# Patient Record
Sex: FEMALE | Race: WHITE | NOT HISPANIC OR LATINO | Employment: UNEMPLOYED | RURAL
[De-identification: names, ages, dates, MRNs, and addresses within clinical notes are randomized per-mention and may not be internally consistent; named-entity substitution may affect disease eponyms.]

---

## 2018-11-15 RX ORDER — NORGESTIMATE AND ETHINYL ESTRADIOL 0.25-0.035
KIT ORAL
Qty: 30 TABLET | Refills: 11 | Status: SHIPPED | OUTPATIENT
Start: 2018-11-15 | End: 2018-11-19 | Stop reason: SDUPTHER

## 2018-11-16 VITALS
HEIGHT: 67 IN | OXYGEN SATURATION: 98 % | TEMPERATURE: 98 F | WEIGHT: 107 LBS | DIASTOLIC BLOOD PRESSURE: 88 MMHG | HEART RATE: 104 BPM | SYSTOLIC BLOOD PRESSURE: 120 MMHG | RESPIRATION RATE: 16 BRPM | BODY MASS INDEX: 16.79 KG/M2

## 2018-11-16 RX ORDER — HYDROXYZINE PAMOATE 25 MG/1
25 CAPSULE ORAL 3 TIMES DAILY PRN
COMMUNITY
End: 2018-11-19 | Stop reason: SDUPTHER

## 2018-11-16 RX ORDER — MONTELUKAST SODIUM 10 MG/1
10 TABLET ORAL NIGHTLY
COMMUNITY
End: 2018-11-19 | Stop reason: SDUPTHER

## 2018-11-16 RX ORDER — ALBUTEROL SULFATE 90 UG/1
2 AEROSOL, METERED RESPIRATORY (INHALATION) EVERY 4 HOURS PRN
COMMUNITY
End: 2019-03-13 | Stop reason: SDUPTHER

## 2018-11-19 ENCOUNTER — OFFICE VISIT (OUTPATIENT)
Dept: FAMILY MEDICINE CLINIC | Facility: CLINIC | Age: 18
End: 2018-11-19

## 2018-11-19 VITALS
HEART RATE: 117 BPM | DIASTOLIC BLOOD PRESSURE: 88 MMHG | OXYGEN SATURATION: 99 % | RESPIRATION RATE: 16 BRPM | WEIGHT: 127 LBS | HEIGHT: 67 IN | SYSTOLIC BLOOD PRESSURE: 129 MMHG | BODY MASS INDEX: 19.93 KG/M2

## 2018-11-19 DIAGNOSIS — L30.9 ECZEMA, UNSPECIFIED TYPE: Primary | ICD-10-CM

## 2018-11-19 DIAGNOSIS — J45.20 MILD INTERMITTENT ASTHMA WITHOUT COMPLICATION: ICD-10-CM

## 2018-11-19 DIAGNOSIS — N92.0 MENORRHAGIA WITH REGULAR CYCLE: ICD-10-CM

## 2018-11-19 PROCEDURE — 99213 OFFICE O/P EST LOW 20 MIN: CPT | Performed by: NURSE PRACTITIONER

## 2018-11-19 RX ORDER — MONTELUKAST SODIUM 10 MG/1
10 TABLET ORAL NIGHTLY
Qty: 30 TABLET | Refills: 5 | Status: SHIPPED | OUTPATIENT
Start: 2018-11-19 | End: 2019-06-10 | Stop reason: SDUPTHER

## 2018-11-19 RX ORDER — HYDROXYZINE PAMOATE 25 MG/1
25 CAPSULE ORAL 3 TIMES DAILY PRN
Qty: 60 CAPSULE | Refills: 5 | Status: SHIPPED | OUTPATIENT
Start: 2018-11-19 | End: 2019-06-10 | Stop reason: SDUPTHER

## 2018-11-19 RX ORDER — NORGESTIMATE AND ETHINYL ESTRADIOL 0.25-0.035
1 KIT ORAL DAILY
Qty: 30 TABLET | Refills: 11 | Status: SHIPPED | OUTPATIENT
Start: 2018-11-19 | End: 2020-01-14 | Stop reason: SINTOL

## 2018-11-19 NOTE — PROGRESS NOTES
Chief Complaint   Patient presents with   • Med Refill     Birthcontrol, hydroxyzine, and singulair.        Subjective   Aydin Thorpe is a 18 y.o.  female who presents today for med refills of her BCP (hormonal regulation), hydroxyzine and Singulair.  She is currently doing well without exacerbation of her eczema.    Aydin Thorpe  has a past medical history of Asthma and Chronic eczematous otitis externa of both ears.    No Known Allergies    Current Outpatient Medications:   •  albuterol (PROVENTIL HFA;VENTOLIN HFA) 108 (90 Base) MCG/ACT inhaler, Inhale 2 puffs Every 4 (Four) Hours As Needed for Wheezing., Disp: , Rfl:   •  ESTARYLLA 0.25-35 MG-MCG per tablet, TAKE ONE TABLET BY MOUTH EVERY DAY, Disp: 30 tablet, Rfl: 11  •  hydrOXYzine (VISTARIL) 25 MG capsule, Take 25 mg by mouth 3 (Three) Times a Day As Needed for Itching., Disp: , Rfl:   •  mometasone (ELOCON) 0.1 % cream, Apply topically 3 times per week to external ears, Disp: 15 g, Rfl: 2  •  montelukast (SINGULAIR) 10 MG tablet, Take 10 mg by mouth Every Night., Disp: , Rfl:   •  PROAIR  (90 BASE) MCG/ACT inhaler, , Disp: , Rfl:   Past Medical History:   Diagnosis Date   • Asthma    • Chronic eczematous otitis externa of both ears      History reviewed. No pertinent surgical history.  Social History     Socioeconomic History   • Marital status: Single     Spouse name: Not on file   • Number of children: Not on file   • Years of education: Not on file   • Highest education level: Not on file   Tobacco Use   • Smoking status: Never Smoker   • Smokeless tobacco: Never Used   Substance and Sexual Activity   • Alcohol use: No   • Drug use: No   • Sexual activity: Defer     History reviewed. No pertinent family history.    Family history, surgical history, past medical history, Allergies and med's reviewed with patient today and updated in [facility] EMR.     ROS:  Review of Systems   Constitutional: Negative.    HENT: Negative.    Eyes: Negative.   "  Respiratory: Negative.    Cardiovascular: Negative.    Gastrointestinal: Negative.    Endocrine: Negative.    Genitourinary: Negative.    Musculoskeletal: Negative.    Skin: Negative.    Allergic/Immunologic: Negative.    Neurological: Negative.    Hematological: Negative.    Psychiatric/Behavioral: Negative.        OBJECTIVE:  Vitals:    11/19/18 1408   BP: 129/88   Pulse: 117   Resp: 16   SpO2: 99%   Weight: 57.6 kg (127 lb)   Height: 170.2 cm (67\")     Physical Exam   Constitutional: She is oriented to person, place, and time. She appears well-developed and well-nourished.   HENT:   Head: Normocephalic and atraumatic.   Eyes: Conjunctivae and EOM are normal. Pupils are equal, round, and reactive to light.   Neck: Normal range of motion. Neck supple.   Cardiovascular: Normal rate, regular rhythm and normal heart sounds.   Pulmonary/Chest: Effort normal and breath sounds normal.   Abdominal: Soft.   Neurological: She is alert and oriented to person, place, and time.   Skin: Skin is warm and dry.   Psychiatric: She has a normal mood and affect. Her behavior is normal. Judgment and thought content normal.   Nursing note and vitals reviewed.      ASSESSMENT/ PLAN:    Aydin was seen today for med refill.    Diagnoses and all orders for this visit:    Eczema, unspecified type  -     hydrOXYzine (VISTARIL) 25 MG capsule; Take 1 capsule by mouth 3 (Three) Times a Day As Needed for Itching.    Mild intermittent asthma without complication  -     montelukast (SINGULAIR) 10 MG tablet; Take 1 tablet by mouth Every Night.    Menorrhagia with regular cycle  -     norgestimate-ethinyl estradiol (ESTARYLLA) 0.25-35 MG-MCG per tablet; Take 1 tablet by mouth Daily.        Orders Placed Today:     New Medications Ordered This Visit   Medications   • norgestimate-ethinyl estradiol (ESTARYLLA) 0.25-35 MG-MCG per tablet     Sig: Take 1 tablet by mouth Daily.     Dispense:  30 tablet     Refill:  11   • hydrOXYzine (VISTARIL) 25 MG " capsule     Sig: Take 1 capsule by mouth 3 (Three) Times a Day As Needed for Itching.     Dispense:  60 capsule     Refill:  5   • montelukast (SINGULAIR) 10 MG tablet     Sig: Take 1 tablet by mouth Every Night.     Dispense:  30 tablet     Refill:  5        Management Plan:     An After Visit Summary was printed and given to the patient at discharge.    Follow-up: Return in about 1 year (around 11/19/2019) for Next scheduled follow up.    Yessi Malone, STEPHANIE 11/19/2018 2:20 PM  This note was electronically signed.

## 2018-12-04 ENCOUNTER — TELEPHONE (OUTPATIENT)
Dept: FAMILY MEDICINE CLINIC | Facility: CLINIC | Age: 18
End: 2018-12-04

## 2018-12-04 RX ORDER — CEPHALEXIN 500 MG/1
500 CAPSULE ORAL 3 TIMES DAILY
Qty: 21 CAPSULE | Refills: 0 | Status: SHIPPED | OUTPATIENT
Start: 2018-12-04 | End: 2018-12-11

## 2018-12-04 NOTE — TELEPHONE ENCOUNTER
PT'S MOTHER WOULD LIKE ANTIBIOTICS CALLED IN FOR HER RIGHT EAR. THE EAR HAS AN ODOR AND GREEN/YELLOW DRAINAGE COMING OUT. PLEASE SEND TO [facility] [place].

## 2019-01-22 ENCOUNTER — OFFICE VISIT (OUTPATIENT)
Dept: FAMILY MEDICINE CLINIC | Facility: CLINIC | Age: 19
End: 2019-01-22

## 2019-01-22 VITALS
SYSTOLIC BLOOD PRESSURE: 136 MMHG | HEART RATE: 120 BPM | OXYGEN SATURATION: 97 % | RESPIRATION RATE: 18 BRPM | BODY MASS INDEX: 21.6 KG/M2 | HEIGHT: 67 IN | DIASTOLIC BLOOD PRESSURE: 96 MMHG | WEIGHT: 137.6 LBS

## 2019-01-22 DIAGNOSIS — H60.313 ACUTE DIFFUSE OTITIS EXTERNA OF BOTH EARS: ICD-10-CM

## 2019-01-22 DIAGNOSIS — H60.543 ECZEMA OF BOTH EXTERNAL EARS: Primary | ICD-10-CM

## 2019-01-22 DIAGNOSIS — R63.5 WEIGHT GAIN: Primary | ICD-10-CM

## 2019-01-22 DIAGNOSIS — R63.5 WEIGHT GAIN: ICD-10-CM

## 2019-01-22 DIAGNOSIS — R68.89 CUSHINGOID FACIES: ICD-10-CM

## 2019-01-22 DIAGNOSIS — R53.83 FATIGUE, UNSPECIFIED TYPE: ICD-10-CM

## 2019-01-22 PROCEDURE — 99213 OFFICE O/P EST LOW 20 MIN: CPT | Performed by: NURSE PRACTITIONER

## 2019-01-22 RX ORDER — BETAMETHASONE DIPROPIONATE 0.5 MG/G
CREAM TOPICAL 2 TIMES DAILY
Qty: 15 G | Refills: 2 | Status: SHIPPED | OUTPATIENT
Start: 2019-01-22 | End: 2021-08-25

## 2019-01-22 NOTE — PROGRESS NOTES
Chief Complaint   Patient presents with   • Rash        Subjective   Aydin Thorpe is a 18 y.o.  female who presents today for flare up of eczema.    HPI:  She states she is having the worst flare up than she has had in quite some time.  She does not note a particular trigger.  She states that her ears are painful and running.  She also has an increase in rash of the antecubital spaces and under the breasts.    Aydin Thorpe  has a past medical history of Asthma and Chronic eczematous otitis externa of both ears.    No Known Allergies    Current Outpatient Medications:   •  albuterol (PROVENTIL HFA;VENTOLIN HFA) 108 (90 Base) MCG/ACT inhaler, Inhale 2 puffs Every 4 (Four) Hours As Needed for Wheezing., Disp: , Rfl:   •  hydrOXYzine (VISTARIL) 25 MG capsule, Take 1 capsule by mouth 3 (Three) Times a Day As Needed for Itching., Disp: 60 capsule, Rfl: 5  •  montelukast (SINGULAIR) 10 MG tablet, Take 1 tablet by mouth Every Night., Disp: 30 tablet, Rfl: 5  •  norgestimate-ethinyl estradiol (ESTARYLLA) 0.25-35 MG-MCG per tablet, Take 1 tablet by mouth Daily., Disp: 30 tablet, Rfl: 11  Past Medical History:   Diagnosis Date   • Asthma    • Chronic eczematous otitis externa of both ears      History reviewed. No pertinent surgical history.  Social History     Socioeconomic History   • Marital status: Single     Spouse name: Not on file   • Number of children: Not on file   • Years of education: Not on file   • Highest education level: Not on file   Tobacco Use   • Smoking status: Never Smoker   • Smokeless tobacco: Never Used   Substance and Sexual Activity   • Alcohol use: No   • Drug use: No   • Sexual activity: Defer     Family History   Problem Relation Age of Onset   • Osteoarthritis Mother    • Asthma Mother    • Rheum arthritis Father    • Asthma Father        Family history, surgical history, past medical history, Allergies and med's reviewed with patient today and updated in [employer] EMR.     ROS:  Review  "of Systems   Constitutional: Negative.    HENT: Negative.    Eyes: Negative.    Respiratory: Negative.    Cardiovascular: Negative.    Gastrointestinal: Negative.    Endocrine: Negative.    Genitourinary: Negative.    Musculoskeletal: Negative.    Skin: Positive for rash.        eczema   Allergic/Immunologic: Negative.    Neurological: Negative.    Hematological: Negative.    Psychiatric/Behavioral: Negative.        OBJECTIVE:  Vitals:    01/22/19 1457   BP: 136/96   BP Location: Left arm   Patient Position: Sitting   Cuff Size: Adult   Pulse: 120   Resp: 18   SpO2: 97%   Weight: 62.4 kg (137 lb 9.6 oz)   Height: 170.2 cm (67\")     Physical Exam   Constitutional: She is oriented to person, place, and time. She appears well-developed and well-nourished.   HENT:   Head: Normocephalic and atraumatic.   Nose: Nose normal.   Mouth/Throat: Oropharynx is clear and moist.   Auditory canals with erythema, edema and mild drainage. There is scaling of the skin of the external ear.   Eyes: Conjunctivae and EOM are normal. Pupils are equal, round, and reactive to light.   Neck: Normal range of motion. Neck supple.   Cardiovascular: Normal rate, regular rhythm and normal heart sounds.   Pulmonary/Chest: Effort normal and breath sounds normal.   Abdominal: Soft. Bowel sounds are normal.   Musculoskeletal: Normal range of motion.   Neurological: She is alert and oriented to person, place, and time.   Skin: Skin is warm and dry. Capillary refill takes less than 2 seconds. Rash noted.   Bilateral ears, antecubital spaces and underneath bilateral breasts consistent with previous outbreaks of eczema.   Psychiatric: She has a normal mood and affect. Her behavior is normal. Judgment and thought content normal.   Nursing note and vitals reviewed.      ASSESSMENT/ PLAN:    Aydin was seen today for rash.    Diagnoses and all orders for this visit:    Eczema of both external ears  -     betamethasone dipropionate (DIPROLENE) 0.05 % cream; " Apply  topically to the appropriate area as directed 2 (Two) Times a Day.    Weight gain    Acute diffuse otitis externa of both ears  -     neomycin-polymyxin-hydrocortisone (CORTISPORIN) 3.5-48531-2 otic solution; Administer 3 drops into both ears 4 (Four) Times a Day for 5 days.        Orders Placed Today:     New Medications Ordered This Visit   Medications   • neomycin-polymyxin-hydrocortisone (CORTISPORIN) 3.5-03616-6 otic solution     Sig: Administer 3 drops into both ears 4 (Four) Times a Day for 5 days.     Dispense:  10 mL     Refill:  0   • betamethasone dipropionate (DIPROLENE) 0.05 % cream     Sig: Apply  topically to the appropriate area as directed 2 (Two) Times a Day.     Dispense:  15 g     Refill:  2        Management Plan:     An After Visit Summary was printed and given to the patient at discharge.    Follow-up: Return in about 3 months (around 4/22/2019) for Next scheduled follow up.    Yessi Malone, STEPHANIE 1/22/2019 3:33 PM  This note was electronically signed.

## 2019-01-23 ENCOUNTER — CLINICAL SUPPORT (OUTPATIENT)
Dept: FAMILY MEDICINE CLINIC | Facility: CLINIC | Age: 19
End: 2019-01-23

## 2019-01-23 ENCOUNTER — RESULTS ENCOUNTER (OUTPATIENT)
Dept: FAMILY MEDICINE CLINIC | Facility: CLINIC | Age: 19
End: 2019-01-23

## 2019-01-23 DIAGNOSIS — R53.83 FATIGUE, UNSPECIFIED TYPE: ICD-10-CM

## 2019-01-23 DIAGNOSIS — R63.5 WEIGHT GAIN: ICD-10-CM

## 2019-01-23 DIAGNOSIS — R68.89 CUSHINGOID FACIES: ICD-10-CM

## 2019-01-25 ENCOUNTER — TELEPHONE (OUTPATIENT)
Dept: FAMILY MEDICINE CLINIC | Facility: CLINIC | Age: 19
End: 2019-01-25

## 2019-01-28 NOTE — TELEPHONE ENCOUNTER
Aydin needs an appointment to discuss lab work.  Nothing serious but issues that we need to discuss.

## 2019-01-31 ENCOUNTER — OFFICE VISIT (OUTPATIENT)
Dept: FAMILY MEDICINE CLINIC | Facility: CLINIC | Age: 19
End: 2019-01-31

## 2019-01-31 VITALS
BODY MASS INDEX: 21.97 KG/M2 | OXYGEN SATURATION: 96 % | HEART RATE: 145 BPM | TEMPERATURE: 98.3 F | DIASTOLIC BLOOD PRESSURE: 89 MMHG | SYSTOLIC BLOOD PRESSURE: 133 MMHG | HEIGHT: 67 IN | WEIGHT: 140 LBS | RESPIRATION RATE: 19 BRPM

## 2019-01-31 DIAGNOSIS — E78.5 HYPERLIPIDEMIA, UNSPECIFIED HYPERLIPIDEMIA TYPE: ICD-10-CM

## 2019-01-31 DIAGNOSIS — E03.9 HYPOTHYROIDISM, UNSPECIFIED TYPE: Primary | ICD-10-CM

## 2019-01-31 DIAGNOSIS — R63.5 WEIGHT GAIN: ICD-10-CM

## 2019-01-31 DIAGNOSIS — I10 ESSENTIAL HYPERTENSION: ICD-10-CM

## 2019-01-31 DIAGNOSIS — L30.9 ECZEMA, UNSPECIFIED TYPE: ICD-10-CM

## 2019-01-31 PROCEDURE — 96372 THER/PROPH/DIAG INJ SC/IM: CPT | Performed by: NURSE PRACTITIONER

## 2019-01-31 PROCEDURE — 99213 OFFICE O/P EST LOW 20 MIN: CPT | Performed by: NURSE PRACTITIONER

## 2019-01-31 RX ORDER — LEVOTHYROXINE SODIUM 0.07 MG/1
75 TABLET ORAL DAILY
Qty: 30 TABLET | Refills: 2 | Status: SHIPPED | OUTPATIENT
Start: 2019-01-31 | End: 2019-04-29 | Stop reason: SDUPTHER

## 2019-01-31 RX ORDER — METHYLPREDNISOLONE ACETATE 40 MG/ML
40 INJECTION, SUSPENSION INTRA-ARTICULAR; INTRALESIONAL; INTRAMUSCULAR; SOFT TISSUE ONCE
Status: COMPLETED | OUTPATIENT
Start: 2019-01-31 | End: 2019-01-31

## 2019-01-31 RX ADMIN — METHYLPREDNISOLONE ACETATE 40 MG: 40 INJECTION, SUSPENSION INTRA-ARTICULAR; INTRALESIONAL; INTRAMUSCULAR; SOFT TISSUE at 15:08

## 2019-01-31 NOTE — PATIENT INSTRUCTIONS
"Fat and Cholesterol Restricted Diet  Getting too much fat and cholesterol in your diet may cause health problems. Following this diet helps keep your fat and cholesterol at normal levels. This can keep you from getting sick.  What types of fat should I choose?  · Choose monosaturated and polyunsaturated fats. These are found in foods such as olive oil, canola oil, flaxseeds, walnuts, almonds, and seeds.  · Eat more omega-3 fats. Good choices include salmon, mackerel, sardines, tuna, flaxseed oil, and ground flaxseeds.  · Limit saturated fats. These are in animal products such as meats, butter, and cream. They can also be in plant products such as palm oil, palm kernel oil, and coconut oil.  · Avoid foods with partially hydrogenated oils in them. These contain trans fats. Examples of foods that have trans fats are stick margarine, some tub margarines, cookies, crackers, and other baked goods.  What general guidelines do I need to follow?  · Check food labels. Look for the words \"trans fat\" and \"saturated fat.\"  · When preparing a meal:  ? Fill half of your plate with vegetables and green salads.  ? Fill one fourth of your plate with whole grains. Look for the word \"whole\" as the first word in the ingredient list.  ? Fill one fourth of your plate with lean protein foods.  · Eat more foods that have fiber, like apples, carrots, beans, peas, and barley.  · Eat more home-cooked foods. Eat less at restaurants and buffets.  · Limit or avoid alcohol.  · Limit foods high in starch and sugar.  · Limit fried foods.  · Cook foods without frying them. Baking, boiling, grilling, and broiling are all great options.  · Lose weight if you are overweight. Losing even a small amount of weight can help your overall health. It can also help prevent diseases such as diabetes and heart disease.  What foods can I eat?  Grains  Whole grains, such as whole wheat or whole grain breads, crackers, cereals, and pasta. Unsweetened oatmeal, " bulgur, barley, quinoa, or brown rice. Corn or whole wheat flour tortillas.  Vegetables  Fresh or frozen vegetables (raw, steamed, roasted, or grilled). Green salads.  Fruits  All fresh, canned (in natural juice), or frozen fruits.  Meat and Other Protein Products  Ground beef (85% or leaner), grass-fed beef, or beef trimmed of fat. Skinless chicken or turkey. Ground chicken or turkey. Pork trimmed of fat. All fish and seafood. Eggs. Dried beans, peas, or lentils. Unsalted nuts or seeds. Unsalted canned or dry beans.  Dairy  Low-fat dairy products, such as skim or 1% milk, 2% or reduced-fat cheeses, low-fat ricotta or cottage cheese, or plain low-fat yogurt.  Fats and Oils  Tub margarines without trans fats. Light or reduced-fat mayonnaise and salad dressings. Avocado. Olive, canola, sesame, or safflower oils. Natural peanut or almond butter (choose ones without added sugar and oil).  The items listed above may not be a complete list of recommended foods or beverages. Contact your dietitian for more options.  What foods are not recommended?  Grains  White bread. White pasta. White rice. Cornbread. Bagels, pastries, and croissants. Crackers that contain trans fat.  Vegetables  White potatoes. Corn. Creamed or fried vegetables. Vegetables in a cheese sauce.  Fruits  Dried fruits. Canned fruit in light or heavy syrup. Fruit juice.  Meat and Other Protein Products  Fatty cuts of meat. Ribs, chicken wings, van, sausage, bologna, salami, chitterlings, fatback, hot dogs, bratwurst, and packaged luncheon meats. Liver and organ meats.  Dairy  Whole or 2% milk, cream, half-and-half, and cream cheese. Whole milk cheeses. Whole-fat or sweetened yogurt. Full-fat cheeses. Nondairy creamers and whipped toppings. Processed cheese, cheese spreads, or cheese curds.  Sweets and Desserts  Corn syrup, sugars, honey, and molasses. Candy. Jam and jelly. Syrup. Sweetened cereals. Cookies, pies, cakes, donuts, muffins, and ice  cream.  Fats and Oils  Butter, stick margarine, lard, shortening, ghee, or van fat. Coconut, palm kernel, or palm oils.  Beverages  Alcohol. Sweetened drinks (such as sodas, lemonade, and fruit drinks or punches).  The items listed above may not be a complete list of foods and beverages to avoid. Contact your dietitian for more information.  This information is not intended to replace advice given to you by your health care provider. Make sure you discuss any questions you have with your health care provider.  Document Released: 06/18/2013 Document Revised: 08/24/2017 Document Reviewed: 03/19/2015  [employer] Interactive Patient Education © 2018 [employer]

## 2019-01-31 NOTE — PROGRESS NOTES
Chief Complaint   Patient presents with   • Results        Subjective   Aydin Thorpe is a 18 y.o.  female who presents today for lab results.    HPI:  Aydin presents today to discuss her lab results.  She continues to be concerned about her weight gain; otherwise, there are no changes.    Aydin Thorpe  has a past medical history of Asthma and Chronic eczematous otitis externa of both ears.    No Known Allergies    Current Outpatient Medications:   •  albuterol (PROVENTIL HFA;VENTOLIN HFA) 108 (90 Base) MCG/ACT inhaler, Inhale 2 puffs Every 4 (Four) Hours As Needed for Wheezing., Disp: , Rfl:   •  betamethasone dipropionate (DIPROLENE) 0.05 % cream, Apply  topically to the appropriate area as directed 2 (Two) Times a Day., Disp: 15 g, Rfl: 2  •  dexamethasone (DECADRON) 0.75 MG tablet, TAKE AS DIRECTED BY MOUTH, Disp: 102 tablet, Rfl: 5  •  hydrOXYzine (VISTARIL) 25 MG capsule, Take 1 capsule by mouth 3 (Three) Times a Day As Needed for Itching., Disp: 60 capsule, Rfl: 5  •  montelukast (SINGULAIR) 10 MG tablet, Take 1 tablet by mouth Every Night., Disp: 30 tablet, Rfl: 5  •  norgestimate-ethinyl estradiol (ESTARYLLA) 0.25-35 MG-MCG per tablet, Take 1 tablet by mouth Daily., Disp: 30 tablet, Rfl: 11  Past Medical History:   Diagnosis Date   • Asthma    • Chronic eczematous otitis externa of both ears      History reviewed. No pertinent surgical history.  Social History     Socioeconomic History   • Marital status: Single     Spouse name: Not on file   • Number of children: Not on file   • Years of education: Not on file   • Highest education level: Not on file   Tobacco Use   • Smoking status: Never Smoker   • Smokeless tobacco: Never Used   Substance and Sexual Activity   • Alcohol use: No   • Drug use: No   • Sexual activity: Defer     Family History   Problem Relation Age of Onset   • Osteoarthritis Mother    • Asthma Mother    • Rheum arthritis Father    • Asthma Father    • No Known Problems  "Brother    • Heart disease Maternal Grandmother    • No Known Problems Maternal Grandfather    • No Known Problems Paternal Grandmother    • No Known Problems Paternal Grandfather    • No Known Problems Brother        Family history, surgical history, past medical history, Allergies and med's reviewed with patient today and updated in [facility] EMR.     ROS:  Review of Systems   Constitutional: Negative.    HENT: Negative.    Eyes: Negative.    Respiratory: Negative.    Cardiovascular: Negative.    Gastrointestinal: Negative.    Endocrine: Negative.    Genitourinary: Negative.    Musculoskeletal: Negative.    Skin: Positive for rash.        Eczema breakout   Allergic/Immunologic: Negative.    Neurological: Negative.    Hematological: Negative.    Psychiatric/Behavioral: Negative.        OBJECTIVE:  Vitals:    01/31/19 1425   BP: 133/89   BP Location: Left arm   Patient Position: Sitting   Cuff Size: Adult   Pulse: (!) 145   Resp: 19   Temp: 98.3 °F (36.8 °C)   TempSrc: Temporal   SpO2: 96%   Weight: 63.5 kg (140 lb)   Height: 170.2 cm (67\")     Physical Exam   Constitutional: She is oriented to person, place, and time. She appears well-developed and well-nourished.   HENT:   Head: Normocephalic and atraumatic.   Eyes: Conjunctivae and EOM are normal. Pupils are equal, round, and reactive to light.   Neck: Normal range of motion. Neck supple.   Cardiovascular: Normal rate, regular rhythm and normal heart sounds.   Pulmonary/Chest: Effort normal and breath sounds normal.   Abdominal: Soft.   Musculoskeletal: Normal range of motion.   Neurological: She is alert and oriented to person, place, and time.   Skin: Skin is warm. Rash noted.   Present on face, antecubital space and abdomen consistent with her eczema.   Psychiatric: She has a normal mood and affect. Her behavior is normal. Judgment and thought content normal.   Nursing note and vitals reviewed.      ASSESSMENT/ PLAN:    Aydin was seen today for " results.    Diagnoses and all orders for this visit:    Hypothyroidism, unspecified type  -     levothyroxine (SYNTHROID) 75 MCG tablet; Take 1 tablet by mouth Daily.    Hyperlipidemia, unspecified hyperlipidemia type    Essential hypertension    Eczema, unspecified type  -     methylPREDNISolone acetate (DEPO-medrol) injection 40 mg; Inject 1 mL into the appropriate muscle as directed by prescriber 1 (One) Time.    Weight gain        Orders Placed Today:     New Medications Ordered This Visit   Medications   • levothyroxine (SYNTHROID) 75 MCG tablet     Sig: Take 1 tablet by mouth Daily.     Dispense:  30 tablet     Refill:  2        Management Plan:     An After Visit Summary was printed and given to the patient at discharge.    Follow-up: Return in about 2 weeks (around 2/14/2019) for b/p check.    Yessi Malone, STEPHANIE 1/31/2019 2:52 PM  This note was electronically signed.

## 2019-02-07 ENCOUNTER — OFFICE VISIT (OUTPATIENT)
Dept: FAMILY MEDICINE CLINIC | Facility: CLINIC | Age: 19
End: 2019-02-07

## 2019-02-07 VITALS
HEIGHT: 67 IN | BODY MASS INDEX: 21.56 KG/M2 | OXYGEN SATURATION: 97 % | HEART RATE: 123 BPM | WEIGHT: 137.4 LBS | SYSTOLIC BLOOD PRESSURE: 120 MMHG | DIASTOLIC BLOOD PRESSURE: 86 MMHG | TEMPERATURE: 98 F

## 2019-02-07 DIAGNOSIS — H60.313 ACUTE DIFFUSE OTITIS EXTERNA OF BOTH EARS: Primary | ICD-10-CM

## 2019-02-07 DIAGNOSIS — L30.9 ECZEMA, UNSPECIFIED TYPE: ICD-10-CM

## 2019-02-07 DIAGNOSIS — H66.90 ACUTE OTITIS MEDIA, UNSPECIFIED OTITIS MEDIA TYPE: ICD-10-CM

## 2019-02-07 DIAGNOSIS — B37.2 CUTANEOUS CANDIDIASIS: ICD-10-CM

## 2019-02-07 PROCEDURE — 99213 OFFICE O/P EST LOW 20 MIN: CPT | Performed by: NURSE PRACTITIONER

## 2019-02-07 RX ORDER — AMOXICILLIN 500 MG/1
500 CAPSULE ORAL 3 TIMES DAILY
Qty: 30 CAPSULE | Refills: 0 | Status: SHIPPED | OUTPATIENT
Start: 2019-02-07 | End: 2019-02-17

## 2019-02-07 RX ORDER — NEOMYCIN SULFATE, POLYMYXIN B SULFATE, HYDROCORTISONE 3.5; 10000; 1 MG/ML; [USP'U]/ML; MG/ML
4 SOLUTION/ DROPS AURICULAR (OTIC) 3 TIMES DAILY
Qty: 3 ML | Refills: 0 | Status: SHIPPED | OUTPATIENT
Start: 2019-02-07 | End: 2019-02-12

## 2019-02-07 RX ORDER — PREDNISONE 10 MG/1
TABLET ORAL
Qty: 18 TABLET | Refills: 0 | Status: SHIPPED | OUTPATIENT
Start: 2019-02-07 | End: 2019-02-21 | Stop reason: SDUPTHER

## 2019-02-07 RX ORDER — FLUCONAZOLE 150 MG/1
TABLET ORAL
Qty: 2 TABLET | Refills: 0 | Status: SHIPPED | OUTPATIENT
Start: 2019-02-07 | End: 2019-05-09

## 2019-02-07 NOTE — PROGRESS NOTES
Chief Complaint   Patient presents with   • Ear Drainage     bilateral , R>L   • Eczema     flare up        Subjective   Aydin Thorpe is a 18 y.o.  female who presents today for bilateral ear drainage.    HPI:  Patient is well known to practice with severe eczema.  She presents today with increase in foul smelling drainage from both ears, R>L.  She also complains of increased outbreak of skin lesions on upper extremities and abdomen.    Aydin Thorpe  has a past medical history of Asthma and Chronic eczematous otitis externa of both ears.    No Known Allergies    Current Outpatient Medications:   •  albuterol (PROVENTIL HFA;VENTOLIN HFA) 108 (90 Base) MCG/ACT inhaler, Inhale 2 puffs Every 4 (Four) Hours As Needed for Wheezing., Disp: , Rfl:   •  betamethasone dipropionate (DIPROLENE) 0.05 % cream, Apply  topically to the appropriate area as directed 2 (Two) Times a Day., Disp: 15 g, Rfl: 2  •  hydrOXYzine (VISTARIL) 25 MG capsule, Take 1 capsule by mouth 3 (Three) Times a Day As Needed for Itching., Disp: 60 capsule, Rfl: 5  •  levothyroxine (SYNTHROID) 75 MCG tablet, Take 1 tablet by mouth Daily., Disp: 30 tablet, Rfl: 2  •  montelukast (SINGULAIR) 10 MG tablet, Take 1 tablet by mouth Every Night., Disp: 30 tablet, Rfl: 5  •  norgestimate-ethinyl estradiol (ESTARYLLA) 0.25-35 MG-MCG per tablet, Take 1 tablet by mouth Daily., Disp: 30 tablet, Rfl: 11  Past Medical History:   Diagnosis Date   • Asthma    • Chronic eczematous otitis externa of both ears      History reviewed. No pertinent surgical history.  Social History     Socioeconomic History   • Marital status: Single     Spouse name: Not on file   • Number of children: Not on file   • Years of education: Not on file   • Highest education level: Not on file   Tobacco Use   • Smoking status: Never Smoker   • Smokeless tobacco: Never Used   Substance and Sexual Activity   • Alcohol use: No   • Drug use: No   • Sexual activity: Defer     Family  "History   Problem Relation Age of Onset   • Osteoarthritis Mother    • Asthma Mother    • Rheum arthritis Father    • Asthma Father    • No Known Problems Brother    • Heart disease Maternal Grandmother    • No Known Problems Maternal Grandfather    • No Known Problems Paternal Grandmother    • No Known Problems Paternal Grandfather    • No Known Problems Brother        Family history, surgical history, past medical history, Allergies and med's reviewed with patient today and updated in [facility] EMR.     ROS:  Review of Systems   Constitutional: Positive for fatigue.   HENT: Positive for ear discharge and ear pain.    Eyes: Negative.    Respiratory: Negative.    Cardiovascular: Negative.    Gastrointestinal: Negative.    Endocrine: Negative.    Genitourinary: Negative.    Musculoskeletal: Negative.    Skin: Positive for rash.   Allergic/Immunologic: Negative.    Neurological: Negative.    Hematological: Negative.    Psychiatric/Behavioral: Negative.        OBJECTIVE:  Vitals:    02/07/19 0838   BP: 120/86   BP Location: Left arm   Patient Position: Sitting   Cuff Size: Adult   Pulse: (!) 123   Temp: 98 °F (36.7 °C)   TempSrc: Tympanic   SpO2: 97%   Weight: 62.3 kg (137 lb 6.4 oz)   Height: 170.2 cm (67\")     Physical Exam   Constitutional: She is oriented to person, place, and time. She appears well-developed and well-nourished.   HENT:   Head: Normocephalic and atraumatic.   Nose: Nose normal.   Mouth/Throat: Oropharynx is clear and moist.   Bilateral auditory canals are erythematous and edematous with white drainage present bilaterally.  The right TM is very erythematous with suppurative effusion.  Nasopharynx and oropharynx are clear.   Eyes: Conjunctivae and EOM are normal. Pupils are equal, round, and reactive to light.   Neck: Normal range of motion. Neck supple.   Cardiovascular: Regular rhythm and normal heart sounds.   tachycardia with auscultated rate of 102.   Pulmonary/Chest: Effort normal and breath sounds " normal.   Abdominal: Soft. Bowel sounds are normal.   Musculoskeletal: Normal range of motion.   Neurological: She is alert and oriented to person, place, and time.   Skin: Skin is warm and dry. Rash noted.   Significant dry patches of eczema on bilateral antecubital spaces and wrists with some weeping. Weeping of the external ears.  Dry scaly patches present on the abdomen.   Psychiatric: She has a normal mood and affect. Her behavior is normal. Judgment and thought content normal.   Nursing note and vitals reviewed.      ASSESSMENT/ PLAN:    Aydin was seen today for ear drainage and eczema.    Diagnoses and all orders for this visit:    Acute diffuse otitis externa of both ears  -     neomycin-polymyxin-hydrocortisone (CORTISPORIN) 1 % solution otic solution; Administer 4 drops into both ears 3 (Three) Times a Day for 5 days.  -     amoxicillin (AMOXIL) 500 MG capsule; Take 1 capsule by mouth 3 (Three) Times a Day for 10 days.    Acute otitis media, unspecified otitis media type  -     amoxicillin (AMOXIL) 500 MG capsule; Take 1 capsule by mouth 3 (Three) Times a Day for 10 days.    Eczema, unspecified type  -     predniSONE (DELTASONE) 10 MG tablet; Tid x 3 days, then bid x 3 days, then q day x 3 days    Cutaneous candidiasis  -     fluconazole (DIFLUCAN) 150 MG tablet; Take one today and one at completion of antibiotics        Orders Placed Today:     New Medications Ordered This Visit   Medications   • neomycin-polymyxin-hydrocortisone (CORTISPORIN) 1 % solution otic solution     Sig: Administer 4 drops into both ears 3 (Three) Times a Day for 5 days.     Dispense:  3 mL     Refill:  0   • amoxicillin (AMOXIL) 500 MG capsule     Sig: Take 1 capsule by mouth 3 (Three) Times a Day for 10 days.     Dispense:  30 capsule     Refill:  0   • fluconazole (DIFLUCAN) 150 MG tablet     Sig: Take one today and one at completion of antibiotics     Dispense:  2 tablet     Refill:  0   • predniSONE (DELTASONE) 10 MG tablet      Sig: Tid x 3 days, then bid x 3 days, then q day x 3 days     Dispense:  18 tablet     Refill:  0        Management Plan:     An After Visit Summary was printed and given to the patient at discharge.    Follow-up: Return in about 3 months (around 5/7/2019) for Next scheduled follow up with labs prior.    Yessi Malone, APRN 2/7/2019 8:53 AM  This note was electronically signed.

## 2019-02-21 ENCOUNTER — TELEPHONE (OUTPATIENT)
Dept: FAMILY MEDICINE CLINIC | Facility: CLINIC | Age: 19
End: 2019-02-21

## 2019-02-21 DIAGNOSIS — L30.9 ECZEMA, UNSPECIFIED TYPE: ICD-10-CM

## 2019-02-21 RX ORDER — PREDNISONE 10 MG/1
TABLET ORAL
Qty: 18 TABLET | Refills: 0 | Status: SHIPPED | OUTPATIENT
Start: 2019-02-21 | End: 2019-05-09

## 2019-03-14 RX ORDER — ALBUTEROL SULFATE 90 UG/1
AEROSOL, METERED RESPIRATORY (INHALATION)
Qty: 18 G | Refills: 5 | Status: SHIPPED | OUTPATIENT
Start: 2019-03-14 | End: 2019-12-19

## 2019-04-29 ENCOUNTER — TELEPHONE (OUTPATIENT)
Dept: FAMILY MEDICINE CLINIC | Facility: CLINIC | Age: 19
End: 2019-04-29

## 2019-04-29 DIAGNOSIS — E03.9 HYPOTHYROIDISM, UNSPECIFIED TYPE: ICD-10-CM

## 2019-04-29 RX ORDER — LEVOTHYROXINE SODIUM 0.07 MG/1
75 TABLET ORAL DAILY
Qty: 30 TABLET | Refills: 0 | Status: SHIPPED | OUTPATIENT
Start: 2019-04-29 | End: 2019-05-02

## 2019-04-29 NOTE — TELEPHONE ENCOUNTER
I am going to give patient a 30 day refill.  However I have no chart here for her.  Patient labs are up to date but it's looking like she needs a CPE.  Can you call and schedule please.   If she has had one then I need to know so I can update her health maint.

## 2019-04-29 NOTE — TELEPHONE ENCOUNTER
Pt mother called requesting refill of Thyroid medication for Pt. She would like this sent to [facility].

## 2019-05-01 ENCOUNTER — CLINICAL SUPPORT (OUTPATIENT)
Dept: FAMILY MEDICINE CLINIC | Facility: CLINIC | Age: 19
End: 2019-05-01

## 2019-05-01 DIAGNOSIS — E03.9 HYPOTHYROIDISM, UNSPECIFIED TYPE: ICD-10-CM

## 2019-05-01 DIAGNOSIS — I10 ESSENTIAL HYPERTENSION: ICD-10-CM

## 2019-05-01 DIAGNOSIS — Z00.00 ANNUAL PHYSICAL EXAM: Primary | ICD-10-CM

## 2019-05-01 DIAGNOSIS — E78.5 HYPERLIPIDEMIA, UNSPECIFIED HYPERLIPIDEMIA TYPE: ICD-10-CM

## 2019-05-02 ENCOUNTER — TELEPHONE (OUTPATIENT)
Dept: FAMILY MEDICINE CLINIC | Facility: CLINIC | Age: 19
End: 2019-05-02

## 2019-05-02 DIAGNOSIS — E03.9 HYPOTHYROIDISM, UNSPECIFIED TYPE: ICD-10-CM

## 2019-05-02 RX ORDER — LEVOTHYROXINE SODIUM 0.05 MG/1
50 TABLET ORAL DAILY
Qty: 30 TABLET | Refills: 5 | Status: SHIPPED | OUTPATIENT
Start: 2019-05-02 | End: 2019-05-09 | Stop reason: DRUGHIGH

## 2019-05-02 NOTE — TELEPHONE ENCOUNTER
Pt mother called regarding thyroid medication. She stated that on 4/29/19 they picked up a script for a 75 mg tablet. She then stated today she picked up a 50 mg tablet. She is unsure which dosage Pt should take? Please call.

## 2019-05-02 NOTE — TELEPHONE ENCOUNTER
I attempted to call.  Went to voicemail that had not been set up yet.  Adamlindahaydee is scheduled for a wellness exam next week with me.  Dr. Tucker reviewed her labs and sent in a lower dose Levothyroxine because of her lab work.  She should take the new lower dose.

## 2019-05-03 NOTE — TELEPHONE ENCOUNTER
Attempted contact with patient .  Number listed for her is no longer a working number and I have removed it from the demo page and I tried to contact her mother and no answer and no vm.

## 2019-05-09 ENCOUNTER — OFFICE VISIT (OUTPATIENT)
Dept: FAMILY MEDICINE CLINIC | Facility: CLINIC | Age: 19
End: 2019-05-09

## 2019-05-09 VITALS
TEMPERATURE: 98.6 F | RESPIRATION RATE: 17 BRPM | SYSTOLIC BLOOD PRESSURE: 134 MMHG | WEIGHT: 140.4 LBS | HEART RATE: 118 BPM | DIASTOLIC BLOOD PRESSURE: 90 MMHG | BODY MASS INDEX: 22.03 KG/M2 | HEIGHT: 67 IN | OXYGEN SATURATION: 97 %

## 2019-05-09 DIAGNOSIS — M79.672 PAIN OF BOTH HEELS: ICD-10-CM

## 2019-05-09 DIAGNOSIS — E24.2 CUSHINGOID SIDE EFFECT OF STEROIDS (HCC): ICD-10-CM

## 2019-05-09 DIAGNOSIS — E78.00 ELEVATED LDL CHOLESTEROL LEVEL: ICD-10-CM

## 2019-05-09 DIAGNOSIS — R03.0 BORDERLINE HYPERTENSION: ICD-10-CM

## 2019-05-09 DIAGNOSIS — R74.8 ELEVATED ALKALINE PHOSPHATASE LEVEL: ICD-10-CM

## 2019-05-09 DIAGNOSIS — M79.671 PAIN OF BOTH HEELS: ICD-10-CM

## 2019-05-09 DIAGNOSIS — L30.9 ECZEMA, UNSPECIFIED TYPE: ICD-10-CM

## 2019-05-09 DIAGNOSIS — E03.9 HYPOTHYROIDISM, UNSPECIFIED TYPE: Primary | ICD-10-CM

## 2019-05-09 DIAGNOSIS — R00.0 TACHYCARDIA: ICD-10-CM

## 2019-05-09 PROCEDURE — 99214 OFFICE O/P EST MOD 30 MIN: CPT | Performed by: NURSE PRACTITIONER

## 2019-05-09 RX ORDER — LEVOTHYROXINE SODIUM 0.05 MG/1
50 TABLET ORAL DAILY
Qty: 30 TABLET | Refills: 5
Start: 2019-05-09 | End: 2020-01-28 | Stop reason: SDUPTHER

## 2019-05-09 NOTE — PROGRESS NOTES
Chief Complaint   Patient presents with   • Results     pt here to discuss abnormal labs        Subjective   Aydin Thorpe is a 18 y.o.  female who presents today for lab results.    HPI:  HEEL PAIN:  Patient has developed bilateral heel pain.  Most painful after being on her feet.  No pain at rest.  No pain on arising in the morning.  Doesn't matter what shoes she is wearing, the pain is present.  ECZEMA:  The patient has good control of skin condition at present.  She has no current concerns in regard to her allergies/rash.    Aydin Thorpe  has a past medical history of Asthma and Chronic eczematous otitis externa of both ears.    No Known Allergies    Current Outpatient Medications:   •  betamethasone dipropionate (DIPROLENE) 0.05 % cream, Apply  topically to the appropriate area as directed 2 (Two) Times a Day., Disp: 15 g, Rfl: 2  •  dexamethasone (DECADRON) 0.75 MG tablet, Take 1 tablet by mouth Daily., Disp: , Rfl:   •  hydrOXYzine (VISTARIL) 25 MG capsule, Take 1 capsule by mouth 3 (Three) Times a Day As Needed for Itching., Disp: 60 capsule, Rfl: 5  •  levothyroxine (SYNTHROID, LEVOTHROID) 50 MCG tablet, Take 1 tablet by mouth Daily., Disp: 30 tablet, Rfl: 5  •  montelukast (SINGULAIR) 10 MG tablet, Take 1 tablet by mouth Every Night., Disp: 30 tablet, Rfl: 5  •  norgestimate-ethinyl estradiol (ESTARYLLA) 0.25-35 MG-MCG per tablet, Take 1 tablet by mouth Daily., Disp: 30 tablet, Rfl: 11  •  VENTOLIN  (90 Base) MCG/ACT inhaler, INHALE TWO PUFFS FOUR TIMES A DAY AS NEEDED, Disp: 18 g, Rfl: 5  Past Medical History:   Diagnosis Date   • Asthma    • Chronic eczematous otitis externa of both ears      History reviewed. No pertinent surgical history.  Social History     Socioeconomic History   • Marital status: Single     Spouse name: Not on file   • Number of children: Not on file   • Years of education: Not on file   • Highest education level: Not on file   Tobacco Use   • Smoking status:  Never Smoker   • Smokeless tobacco: Never Used   Substance and Sexual Activity   • Alcohol use: No   • Drug use: No   • Sexual activity: Defer     Family History   Problem Relation Age of Onset   • Osteoarthritis Mother    • Asthma Mother    • Rheum arthritis Father    • Asthma Father    • No Known Problems Brother    • Heart disease Maternal Grandmother    • No Known Problems Maternal Grandfather    • No Known Problems Paternal Grandmother    • No Known Problems Paternal Grandfather    • No Known Problems Brother        Family history, surgical history, past medical history, Allergies and med's reviewed with patient today and updated in [facility] EMR.     ROS:  Review of Systems   Constitutional: Negative.  Negative for fatigue, fever and unexpected weight change.   HENT: Negative.  Negative for facial swelling, sore throat and trouble swallowing.    Eyes: Negative.  Negative for photophobia, discharge and visual disturbance.   Respiratory: Negative.  Negative for cough, chest tightness and shortness of breath.    Cardiovascular: Negative.  Negative for chest pain and palpitations.   Gastrointestinal: Negative.  Negative for abdominal pain, diarrhea, nausea and vomiting.   Endocrine: Negative.  Negative for polydipsia, polyphagia and polyuria.   Genitourinary: Negative.  Negative for dysuria, flank pain and frequency.   Musculoskeletal: Negative.  Negative for back pain, gait problem and neck pain.        Bilateral heel pain.   Skin: Negative.  Negative for rash.        History of significant eczema.   Allergic/Immunologic: Negative.    Neurological: Negative.  Negative for dizziness, light-headedness and headaches.   Hematological: Negative.    Psychiatric/Behavioral: Negative.  Negative for self-injury and suicidal ideas.       OBJECTIVE:  Vitals:    05/09/19 1544   BP: 134/90   BP Location: Left arm   Patient Position: Sitting   Cuff Size: Adult   Pulse: 118   Resp: 17   Temp: 98.6 °F (37 °C)   TempSrc: Tympanic  "  SpO2: 97%   Weight: 63.7 kg (140 lb 6.4 oz)   Height: 170.2 cm (67\")     Physical Exam   Constitutional: She is oriented to person, place, and time. She appears well-developed and well-nourished. No distress.   Cushingoid appearance.   HENT:   Head: Normocephalic and atraumatic.   Eyes: Conjunctivae and EOM are normal. Pupils are equal, round, and reactive to light.   Neck: Normal range of motion. Neck supple.   Cardiovascular: Normal rate, regular rhythm, normal heart sounds and intact distal pulses.   No murmur heard.  Pulmonary/Chest: Effort normal and breath sounds normal. No respiratory distress.   Abdominal: Soft. Bowel sounds are normal. She exhibits no distension. There is no tenderness.   Musculoskeletal: Normal range of motion. She exhibits tenderness. She exhibits no edema.   Tenderness to bilateral heels.   Neurological: She is alert and oriented to person, place, and time.   Skin: Skin is warm and dry. Capillary refill takes less than 2 seconds. She is not diaphoretic. No erythema.   Psychiatric: She has a normal mood and affect. Her behavior is normal. Judgment and thought content normal.   Nursing note and vitals reviewed.      ASSESSMENT/ PLAN:    Aydin was seen today for results.    Diagnoses and all orders for this visit:    Hypothyroidism, unspecified type  -     levothyroxine (SYNTHROID, LEVOTHROID) 50 MCG tablet; Take 1 tablet by mouth Daily.    Eczema, unspecified type    Cushingoid side effect of steroids (CMS/HCC)    Pain of both heels  -     XR Foot 3+ View Left; Future  -     XR Foot 3+ View Right; Future    Tachycardia    Borderline hypertension    Elevated LDL cholesterol level    Elevated alkaline phosphatase level    Long discussion with Aydin and her mother regarding her multiple abnormal lab values.  Provider feels that many of these values are elevated related to her frequent and necessary use of steroids.  Patient will get appointment with Dr. Barksdale (allergist) in follow up " to see if more can be done to control her symptoms than the repeated use of both oral and injectable steroids.  She will follow up in 3 months with repeat of labs prior.    Orders Placed Today:     New Medications Ordered This Visit   Medications   • levothyroxine (SYNTHROID, LEVOTHROID) 50 MCG tablet     Sig: Take 1 tablet by mouth Daily.     Dispense:  30 tablet     Refill:  5        Management Plan:     An After Visit Summary was printed and given to the patient at discharge.    Follow-up: Return in about 3 months (around 8/9/2019) for Recheck with lipid panel; TSH/T4.    Yessi Malone, STEPHANIE 5/9/2019 4:22 PM  This note was electronically signed.

## 2019-05-09 NOTE — PATIENT INSTRUCTIONS
"Cushing Syndrome  Cushing syndrome is a condition in which the body produces too much cortisol. Cortisol is a chemical (hormone) made by the brain. It regulates how the body fights disease (immune system) and also how the body uses energy. Two glands at the top of the kidneys (adrenal glands) make cortisol. Cortisol is sometimes called \"the stress hormone\" because the adrenal glands release it during times of stress.  Too much cortisol in the body can be caused by an abnormal growth (tumor) in one or both of the adrenal glands or by a tumor in the pituitary gland. The pituitary gland in the brain produces adrenocorticotropic hormone (ACTH). ACTH causes the adrenal glands to make more cortisol. The tumors that cause Cushing syndrome are usually benign. This means that they are not cancerous.  What are the causes?  The most common cause of Cushing syndrome is taking anti-inflammatory medicines (steroids) for a long period of time. Steroids work in the same way as cortisol in the body. Other causes include tumors:  · In the adrenal gland.  · In the pituitary gland.  · In the lungs.  · In the pancreas.  · In the thyroid.  · In the thymus.    What increases the risk?  The following factors make you more likely to develop this condition:  · Taking steroid medicines for a long period of time.  · Being a woman. Women are more likely to develop pituitary or adrenal gland tumors.  · Being 25-40 years old.    What are the signs or symptoms?  Symptoms of this condition vary widely. There are a lot of possible symptoms of Cushing syndrome because cortisol affects so many parts of the body. Some people have only mild symptoms, and others may have very severe symptoms. The most common symptoms are:  · Weight gain.  · High blood pressure.  · Memory loss.  · Poor concentration.  · Irritability.  · Round face (moon face).  · Fat around the neck, especially in back (buffalo hump).  · Fatigue  · Menstrual problems in women.    Other " symptoms include:  · Skin changes, such as acne, bruising, infections, or stretch marks.  · Abnormal hair growth in women (hirsutism).  · Loss of sexual function in men (impotence).  · Muscle weakness.  · Difficulty falling asleep or staying asleep (insomnia).  · Depression.  · Broken bones due to bone weakness (osteopenia).    How is this diagnosed?  Cushing syndrome is hard to diagnose because many conditions cause similar symptoms. Your health care provider can make the diagnosis based on your medical history and a physical exam. Your health care provider may do tests, including:  · Blood tests to measure white blood cells and blood sugar. These are often higher than normal if you have Cushing syndrome.  · Urine test to measure the amount of cortisol in your urine over 24 hours.  · Checking your saliva for cortisol.  · Giving you a medicine similar to cortisol (dexamethasone suppression test) to see whether your natural cortisol level decreases.    If you are diagnosed with Cushing syndrome, you may need more tests to find the cause. These tests may include:  · Inferior petrosal sinus sampling. This measures your ACTH levels to see whether they are high or low. During this test:  ? Thin tubes (catheters) are inserted into veins in your groin, and moved into blood vessels close to your pituitary gland.  ? Blood samples are taken from the area near your pituitary gland, and from the pituitary gland itself.  ? The amount of ACTH in the blood samples is measured, and the blood samples are compared to see whether your ACTH is high or low.  · Imaging tests to check for tumors, such as:  ? MRI.  ? CT scan.    How is this treated?  Treatment for Cushing syndrome depends on the cause.  · If the cause is steroid medicines, your health care provider will gradually reduce your dosage.  · If the cause is a pituitary or adrenal gland tumor, treatment may include:  ? Surgery to remove the tumor.  ? X-ray treatment (radiation  therapy) to destroy a pituitary tumor that cannot be completely removed.  ? Surgery to remove the adrenal glands.  ? Medicine to stop tumor growth and decrease the amount of ACTH produced by the pituitary.  ? Medicines to block the effects of cortisol.    Because radiation therapy or surgery may affect pituitary cells that make other important hormones, you may need to take artificial hormones (hormone replacement therapy) after treatment.  Follow these instructions at home:  · Take over-the-counter and prescription medicines only as told by your health care provider.  · Eat a healthy diet. Include good food sources of calcium and vitamin D, such as dairy products with calcium and vitamin D added (fortified dairy products).  · Get regular exercise as directed. If your bones are weak, do not do activities that involve a lot of running or jumping (high-impact activities). Ask your health care provider what activities are safe for you.  · Do not use any products that contain nicotine or tobacco, such as cigarettes and e-cigarettes. If you need help quitting, ask your health care provider.  · Limit alcohol intake to no more than 1 drink a day for nonpregnant women and 2 drinks a day for men. One drink equals 12 oz of beer, 5 oz of wine, or 1½ oz of hard liquor.  · Keep all follow-up visits as told by your health care provider. This is important.  Contact a health care provider if:  · You have a fever or chills.  · You are struggling to manage your symptoms.  Get help right away if:  · You feel very light-headed and weak.  · You have severe abdominal pain.  · You have sudden changes in your vision.  · You become very confused.  · You have trouble breathing.  · You have chest pain.  Summary  · Cushing syndrome is a condition in which the body produces too much cortisol, a hormone that regulates how the body fights disease and how it uses energy.  · The most common cause of this condition is long term use of steroids, as  well as tumors in the adrenal glands.  · Cushing syndrome is treated by gradually stopping the use of steroids.  · If you have Cushing syndrome, you should get help right away if you feel light-headed, or if you have severe pain in the abdomen, or changes in your vision.  This information is not intended to replace advice given to you by your health care provider. Make sure you discuss any questions you have with your health care provider.  Document Released: 12/08/2003 Document Revised: 02/12/2018 Document Reviewed: 06/28/2017  [employer] Interactive Patient Education © 2019 [employer]

## 2019-06-10 DIAGNOSIS — L30.9 ECZEMA, UNSPECIFIED TYPE: ICD-10-CM

## 2019-06-10 DIAGNOSIS — J45.20 MILD INTERMITTENT ASTHMA WITHOUT COMPLICATION: ICD-10-CM

## 2019-06-10 RX ORDER — HYDROXYZINE PAMOATE 25 MG/1
25 CAPSULE ORAL 3 TIMES DAILY PRN
Qty: 60 CAPSULE | Refills: 5 | Status: SHIPPED | OUTPATIENT
Start: 2019-06-10 | End: 2022-11-08

## 2019-06-10 RX ORDER — MONTELUKAST SODIUM 10 MG/1
10 TABLET ORAL NIGHTLY
Qty: 30 TABLET | Refills: 5 | Status: SHIPPED | OUTPATIENT
Start: 2019-06-10 | End: 2019-12-03 | Stop reason: SDUPTHER

## 2019-08-08 ENCOUNTER — OFFICE VISIT (OUTPATIENT)
Dept: FAMILY MEDICINE CLINIC | Facility: CLINIC | Age: 19
End: 2019-08-08

## 2019-08-08 VITALS
HEART RATE: 112 BPM | SYSTOLIC BLOOD PRESSURE: 105 MMHG | WEIGHT: 145.4 LBS | BODY MASS INDEX: 22.82 KG/M2 | OXYGEN SATURATION: 97 % | TEMPERATURE: 98.8 F | HEIGHT: 67 IN | DIASTOLIC BLOOD PRESSURE: 70 MMHG

## 2019-08-08 DIAGNOSIS — L30.9 ECZEMA OF BOTH UPPER EXTREMITIES: ICD-10-CM

## 2019-08-08 DIAGNOSIS — H60.543 ECZEMA OF EXTERNAL EAR, BILATERAL: Primary | ICD-10-CM

## 2019-08-08 PROCEDURE — 96372 THER/PROPH/DIAG INJ SC/IM: CPT | Performed by: NURSE PRACTITIONER

## 2019-08-08 PROCEDURE — 99212 OFFICE O/P EST SF 10 MIN: CPT | Performed by: NURSE PRACTITIONER

## 2019-08-08 RX ORDER — METHYLPREDNISOLONE ACETATE 40 MG/ML
40 INJECTION, SUSPENSION INTRA-ARTICULAR; INTRALESIONAL; INTRAMUSCULAR; SOFT TISSUE ONCE
Status: COMPLETED | OUTPATIENT
Start: 2019-08-08 | End: 2019-08-08

## 2019-08-08 RX ADMIN — METHYLPREDNISOLONE ACETATE 40 MG: 40 INJECTION, SUSPENSION INTRA-ARTICULAR; INTRALESIONAL; INTRAMUSCULAR; SOFT TISSUE at 16:54

## 2019-08-08 NOTE — PROGRESS NOTES
Chief Complaint   Patient presents with   • Eczema     flare up about 4-5 days ago        Subjective   Aydin Thorpe is a 19 y.o.  female who presents today for eczema.    HPI:  Patient started a flare up about 4-5 days ago.  Mainly upper extremities at antecubital spaces, chin, neck and bilateral ears.  Itch, dry patches.    Aydin Thorpe  has a past medical history of Asthma and Chronic eczematous otitis externa of both ears.    No Known Allergies    Current Outpatient Medications:   •  dexamethasone (DECADRON) 0.75 MG tablet, Take 1 tablet by mouth Daily As Needed., Disp: , Rfl:   •  hydrOXYzine pamoate (VISTARIL) 25 MG capsule, Take 1 capsule by mouth 3 (Three) Times a Day As Needed for Itching., Disp: 60 capsule, Rfl: 5  •  levothyroxine (SYNTHROID, LEVOTHROID) 50 MCG tablet, Take 1 tablet by mouth Daily., Disp: 30 tablet, Rfl: 5  •  montelukast (SINGULAIR) 10 MG tablet, Take 1 tablet by mouth Every Night., Disp: 30 tablet, Rfl: 5  •  norgestimate-ethinyl estradiol (ESTARYLLA) 0.25-35 MG-MCG per tablet, Take 1 tablet by mouth Daily., Disp: 30 tablet, Rfl: 11  •  VENTOLIN  (90 Base) MCG/ACT inhaler, INHALE TWO PUFFS FOUR TIMES A DAY AS NEEDED, Disp: 18 g, Rfl: 5  •  betamethasone dipropionate (DIPROLENE) 0.05 % cream, Apply  topically to the appropriate area as directed 2 (Two) Times a Day., Disp: 15 g, Rfl: 2    Current Facility-Administered Medications:   •  methylPREDNISolone acetate (DEPO-medrol) injection 40 mg, 40 mg, Intramuscular, Once, Yessi Malone, STEPHANIE  Past Medical History:   Diagnosis Date   • Asthma    • Chronic eczematous otitis externa of both ears      History reviewed. No pertinent surgical history.  Social History     Socioeconomic History   • Marital status: Single     Spouse name: Not on file   • Number of children: Not on file   • Years of education: Not on file   • Highest education level: Not on file   Tobacco Use   • Smoking status: Never Smoker   • Smokeless  "tobacco: Never Used   Substance and Sexual Activity   • Alcohol use: No   • Drug use: No   • Sexual activity: Defer     Family History   Problem Relation Age of Onset   • Osteoarthritis Mother    • Asthma Mother    • Rheum arthritis Father    • Asthma Father    • No Known Problems Brother    • Heart disease Maternal Grandmother    • No Known Problems Maternal Grandfather    • No Known Problems Paternal Grandmother    • No Known Problems Paternal Grandfather    • No Known Problems Brother        Family history, surgical history, past medical history, Allergies and med's reviewed with patient today and updated in [facility] EMR.     ROS:  Review of Systems   Constitutional: Negative.  Negative for fatigue, fever and unexpected weight change.   HENT: Negative.  Negative for facial swelling, sore throat and trouble swallowing.    Eyes: Negative.  Negative for photophobia, discharge and visual disturbance.   Respiratory: Negative.  Negative for cough, chest tightness and shortness of breath.    Cardiovascular: Negative.  Negative for chest pain and palpitations.   Gastrointestinal: Negative.  Negative for abdominal pain, diarrhea, nausea and vomiting.   Endocrine: Negative.  Negative for polydipsia, polyphagia and polyuria.   Genitourinary: Negative.  Negative for dysuria, flank pain and frequency.   Musculoskeletal: Negative.  Negative for back pain, gait problem and neck pain.   Skin: Positive for rash.   Allergic/Immunologic: Negative.    Neurological: Negative.  Negative for dizziness, light-headedness and headaches.   Hematological: Negative.    Psychiatric/Behavioral: Negative.  Negative for self-injury and suicidal ideas.       OBJECTIVE:  Vitals:    08/08/19 1625   BP: 105/70   Pulse: 112   Temp: 98.8 °F (37.1 °C)   SpO2: 97%   Weight: 66 kg (145 lb 6.4 oz)   Height: 170.2 cm (67.01\")     Physical Exam   Constitutional: She is oriented to person, place, and time. She appears well-developed and well-nourished. No " distress.   HENT:   Head: Normocephalic and atraumatic.   Eyes: Conjunctivae and EOM are normal. Pupils are equal, round, and reactive to light.   Neck: Normal range of motion. Neck supple.   Cardiovascular: Normal rate, regular rhythm, normal heart sounds and intact distal pulses.   No murmur heard.  Pulmonary/Chest: Effort normal and breath sounds normal. No respiratory distress.   Abdominal: Soft. Bowel sounds are normal. She exhibits no distension. There is no tenderness.   Musculoskeletal: Normal range of motion. She exhibits no edema.   Neurological: She is alert and oriented to person, place, and time.   Skin: Skin is warm and dry. Capillary refill takes less than 2 seconds. Rash noted. She is not diaphoretic. No erythema.   Red fine rash to bilateral antecubital spaces, open area 2 cm x 1 cm to posterior chin, and dry patches to bilateral pinna and posterior ears.   Psychiatric: She has a normal mood and affect. Her behavior is normal. Judgment and thought content normal.   Nursing note and vitals reviewed.      ASSESSMENT/ PLAN:    Aydin was seen today for eczema.    Diagnoses and all orders for this visit:    Eczema of external ear, bilateral  -     methylPREDNISolone acetate (DEPO-medrol) injection 40 mg    Eczema of both upper extremities  -     methylPREDNISolone acetate (DEPO-medrol) injection 40 mg        Orders Placed Today:     New Medications Ordered This Visit   Medications   • methylPREDNISolone acetate (DEPO-medrol) injection 40 mg        Management Plan:     An After Visit Summary was printed and given to the patient at discharge.    Follow-up: Return if symptoms worsen or fail to improve.    Yessi Malone, STEPHANIE 8/8/2019 4:53 PM  This note was electronically signed.

## 2019-08-19 ENCOUNTER — RESULTS ENCOUNTER (OUTPATIENT)
Dept: FAMILY MEDICINE CLINIC | Facility: CLINIC | Age: 19
End: 2019-08-19

## 2019-08-19 ENCOUNTER — CLINICAL SUPPORT (OUTPATIENT)
Dept: FAMILY MEDICINE CLINIC | Facility: CLINIC | Age: 19
End: 2019-08-19

## 2019-08-19 DIAGNOSIS — E03.9 HYPOTHYROIDISM, UNSPECIFIED TYPE: ICD-10-CM

## 2019-08-19 DIAGNOSIS — E78.5 HYPERLIPIDEMIA, UNSPECIFIED HYPERLIPIDEMIA TYPE: Primary | ICD-10-CM

## 2019-08-19 DIAGNOSIS — E78.5 HYPERLIPIDEMIA, UNSPECIFIED HYPERLIPIDEMIA TYPE: ICD-10-CM

## 2019-10-09 DIAGNOSIS — E03.9 HYPOTHYROIDISM, UNSPECIFIED TYPE: ICD-10-CM

## 2019-10-09 DIAGNOSIS — N92.0 MENORRHAGIA WITH REGULAR CYCLE: Primary | ICD-10-CM

## 2019-10-09 RX ORDER — NORGESTIMATE AND ETHINYL ESTRADIOL 0.25-0.035
KIT ORAL
Qty: 28 TABLET | Refills: 11 | Status: SHIPPED | OUTPATIENT
Start: 2019-10-09 | End: 2020-01-14 | Stop reason: SINTOL

## 2019-10-09 RX ORDER — LEVOTHYROXINE SODIUM 0.05 MG/1
TABLET ORAL
Qty: 30 TABLET | Refills: 2 | Status: SHIPPED | OUTPATIENT
Start: 2019-10-09 | End: 2020-01-14 | Stop reason: SDUPTHER

## 2019-12-03 DIAGNOSIS — J45.20 MILD INTERMITTENT ASTHMA WITHOUT COMPLICATION: ICD-10-CM

## 2019-12-04 RX ORDER — HYDROXYZINE HYDROCHLORIDE 25 MG/1
TABLET, FILM COATED ORAL
Qty: 60 TABLET | Refills: 5 | Status: SHIPPED | OUTPATIENT
Start: 2019-12-04 | End: 2021-08-25

## 2019-12-04 RX ORDER — MONTELUKAST SODIUM 10 MG/1
TABLET ORAL
Qty: 90 TABLET | Refills: 1 | Status: SHIPPED | OUTPATIENT
Start: 2019-12-04 | End: 2020-06-10

## 2019-12-19 RX ORDER — ALBUTEROL SULFATE 90 UG/1
AEROSOL, METERED RESPIRATORY (INHALATION)
Qty: 18 G | Refills: 5 | Status: SHIPPED | OUTPATIENT
Start: 2019-12-19 | End: 2020-09-28

## 2020-01-06 ENCOUNTER — TELEPHONE (OUTPATIENT)
Dept: FAMILY MEDICINE CLINIC | Facility: CLINIC | Age: 20
End: 2020-01-06

## 2020-01-06 NOTE — TELEPHONE ENCOUNTER
Pt mother called, stated that Pt is currently admitted to [facility] for a blood clot in her brain. She advised that Pt has also had a stroke. She wanted to notify Dr. Tucker.

## 2020-01-09 ENCOUNTER — TRANSITIONAL CARE MANAGEMENT TELEPHONE ENCOUNTER (OUTPATIENT)
Dept: FAMILY MEDICINE CLINIC | Facility: CLINIC | Age: 20
End: 2020-01-09

## 2020-01-14 ENCOUNTER — OFFICE VISIT (OUTPATIENT)
Dept: FAMILY MEDICINE CLINIC | Facility: CLINIC | Age: 20
End: 2020-01-14

## 2020-01-14 ENCOUNTER — CLINICAL SUPPORT (OUTPATIENT)
Dept: FAMILY MEDICINE CLINIC | Facility: CLINIC | Age: 20
End: 2020-01-14

## 2020-01-14 VITALS
WEIGHT: 146.2 LBS | OXYGEN SATURATION: 97 % | SYSTOLIC BLOOD PRESSURE: 128 MMHG | DIASTOLIC BLOOD PRESSURE: 80 MMHG | HEART RATE: 145 BPM | HEIGHT: 67 IN | BODY MASS INDEX: 22.95 KG/M2

## 2020-01-14 DIAGNOSIS — Z51.81 ENCOUNTER FOR MONITORING COUMADIN THERAPY: Primary | ICD-10-CM

## 2020-01-14 DIAGNOSIS — Z79.01 ENCOUNTER FOR MONITORING COUMADIN THERAPY: Primary | ICD-10-CM

## 2020-01-14 DIAGNOSIS — I61.9 INTRAPARENCHYMAL HEMORRHAGE OF BRAIN (HCC): Primary | ICD-10-CM

## 2020-01-14 PROBLEM — E03.9 HYPOTHYROID: Status: ACTIVE | Noted: 2020-01-03

## 2020-01-14 PROBLEM — L30.9 ECZEMA: Status: ACTIVE | Noted: 2020-01-14

## 2020-01-14 PROBLEM — J40 BRONCHITIS: Status: ACTIVE | Noted: 2020-01-03

## 2020-01-14 PROBLEM — G08 THROMBOSIS OF SUPERIOR SAGITTAL SINUS: Status: ACTIVE | Noted: 2020-01-02

## 2020-01-14 PROBLEM — E78.5 HLD (HYPERLIPIDEMIA): Status: ACTIVE | Noted: 2020-01-08

## 2020-01-14 PROBLEM — J45.909 ASTHMA: Status: ACTIVE | Noted: 2020-01-03

## 2020-01-14 PROBLEM — E04.1 THYROID NODULE: Status: ACTIVE | Noted: 2020-01-08

## 2020-01-14 PROBLEM — H10.9 CONJUNCTIVITIS: Status: ACTIVE | Noted: 2020-01-03

## 2020-01-14 PROBLEM — R00.0 TACHYCARDIA: Status: ACTIVE | Noted: 2020-01-08

## 2020-01-14 PROCEDURE — 99214 OFFICE O/P EST MOD 30 MIN: CPT | Performed by: FAMILY MEDICINE

## 2020-01-14 RX ORDER — ATORVASTATIN CALCIUM 40 MG/1
40 TABLET, FILM COATED ORAL DAILY
COMMUNITY
Start: 2020-01-08 | End: 2021-01-04 | Stop reason: SDUPTHER

## 2020-01-14 RX ORDER — WARFARIN SODIUM 5 MG/1
2.5 TABLET ORAL EVERY EVENING
COMMUNITY
Start: 2020-01-08 | End: 2020-02-08

## 2020-01-14 NOTE — PROGRESS NOTES
[facility]  TRANSITIONAL CARE MANAGEMENT VISIT         DISCHARGING PHYSICIAN: german Reddy  DISCHARGE DATE: 1/2/20-1/8/20  DISCHARGE from FACILITY: [facility]    DISCHARGE DIAGNOSES:  There are no active hospital problems to display for this patient.      PMHx significant for Asthma, Eczema, Hypothyroidism, and on OCP for years for menstrual irregularity who presented with sudden onset of L sided weakness and numbness on 1/2. CT head/CTA/CTV demonstrated dural venous thrombosis in superior sagittal sinus and L transverse sinus associated with SAH in the b/l frontal convexities, IVH in the R occipital horn, and R parietal lobe hemorrhage. Given hemorrhage secondary to dural venous thrombosis, patient was initiated on low dose heparin gtt with close monitoring in the N-ICU. NSGY consulted and recommend nonsurgical management with AC. Warfarin started 1/4 with Lovenox bridge and INR now therapueutic. Patient has remained stable. Initially recommended for IPR but doing so well that patient to discharge home with outpatient therapy. Will be enrolled in Warfarin clinic for INR monitoring and will need close PCP and Neurology f/up in the outpatient setting.     CT head/ CTA/CTV demonstrated dural venous thrombosis in superior sagittal sinus and L transverse sinus associated with SAH in the b/l frontal convexities, IVH in the R occipital horn, and R parietal lobe hemorrhage. Given hemorrhage secondary to dural venous thrombosis, patient was initiated on low dose heparin gtt with close monitoring in the N-ICU. NSGY consulted and recommend nonsurgical management with AC. Warfarin started 1/4. Transitioned heparin gtt to lovenox on 1/6. Patient has remained stable and deemed appropriate for transfer to stepdown on 1/5 and then floor level of care on 1/6. Has only complained of mild occipital and temporal headaches during admission that were relieved by tylenol. Only mild L-sided  weakness on exam. Sensation intact. , Atorvastatin 40 was started with goal LDL<100. Possible that combination of OCP and recent URI led to the thrombosis, but patient should have a hypercoagulable workup in the outpatient setting. PT/OT evaluated patient and recommended IPR initially but patient later improved to only need outpatient therapy. INR upon discharge was 2. She was discharged on 1/8/20 and driven home by her family in stable condition.     Patient noted to have intermittent tachycardia, some at rest but mostly with activity. HR up to 160s at the most but patient remained asymptomatic. She had no signs of orthostasis on vitals. Denied any symptoms with her tachycardia. Thought to be possibly related to mild dehydration and deconditioning. Placed on 30 day heart monitor to evaluate further. Could also be due to Levothyroxine dose needing to be adjusted. Will consider the potential for adjustment once all is reviewed.    Of note patient completed 5 day course of treatment for bronchitis with amoxicillin (started before admission), which was switched to ceftriaxone d/t nausea and vomiting. Also was treated for conjunctivitis of R eye with erythromycin ointment (end date 1/9). Her work-up and plan is summarized below:          INITIAL TCM Phone Encounter was made on 1/9/20.    FACE-to-FACE evaluation performed on 01/14/2020.  This was within within 7 (High Complexity) days of discharge.    MEDICATION RECONCILIATION: Medication list was reviewed and reconciled, and new list provided to patient/family/caregiver via AVS.    ALLERGIES:  Patient has no known allergies.    CURRENT MEDICATION LIST:    Current Outpatient Medications:   •  ALBUTEROL SULFATE  (90 Base) MCG/ACT inhaler, INHALE TWO PUFFS BY MOUTH FOUR TIMES A DAY AS NEEDED, Disp: 18 g, Rfl: 5  •  atorvastatin (LIPITOR) 40 MG tablet, Take 40 mg by mouth Daily., Disp: , Rfl:   •  betamethasone dipropionate (DIPROLENE) 0.05 % cream, Apply   "topically to the appropriate area as directed 2 (Two) Times a Day., Disp: 15 g, Rfl: 2  •  dexamethasone (DECADRON) 0.75 MG tablet, Take 1 tablet by mouth Daily As Needed., Disp: , Rfl:   •  hydrOXYzine (ATARAX) 25 MG tablet, TAKE ONE TABLET BY MOUTH TWICE A DAY, Disp: 60 tablet, Rfl: 5  •  hydrOXYzine pamoate (VISTARIL) 25 MG capsule, Take 1 capsule by mouth 3 (Three) Times a Day As Needed for Itching., Disp: 60 capsule, Rfl: 5  •  levothyroxine (SYNTHROID, LEVOTHROID) 50 MCG tablet, Take 1 tablet by mouth Daily., Disp: 30 tablet, Rfl: 5  •  montelukast (SINGULAIR) 10 MG tablet, TAKE ONE TABLET BY MOUTH EVERY EVENING, Disp: 90 tablet, Rfl: 1  •  warfarin (COUMADIN) 5 MG tablet, Take 2.5 mg by mouth Every Evening., Disp: , Rfl:     ROS:  Review of Systems   Constitutional: Negative for activity change, fatigue, fever, unexpected weight gain and unexpected weight loss.   Respiratory: Negative for shortness of breath.    Cardiovascular: Negative for chest pain.   Gastrointestinal: Negative for abdominal pain.   Genitourinary: Negative for difficulty urinating.   Skin: Negative for rash.   Neurological: Negative for syncope and headache.       PATIENT EXAM:  Vital Signs:  /80 (BP Location: Left arm, Patient Position: Sitting, Cuff Size: Adult)   Pulse (!) 145   Ht 170.2 cm (67\")   Wt 66.3 kg (146 lb 3.2 oz)   LMP  (LMP Unknown)   SpO2 97%   Breastfeeding No   BMI 22.90 kg/m²   Physical Exam   Constitutional: She is oriented to person, place, and time. She appears well-developed and well-nourished. No distress.   HENT:   Head: Normocephalic and atraumatic.   Mouth/Throat: Oropharynx is clear and moist.   Neck: Normal range of motion. Neck supple. No JVD present.   Cardiovascular: Normal rate, regular rhythm, normal heart sounds and intact distal pulses.   Pulmonary/Chest: Effort normal and breath sounds normal. No respiratory distress.   Musculoskeletal: Normal range of motion. She exhibits no edema. "   Neurological: She is alert and oriented to person, place, and time. No cranial nerve deficit.   Skin: Skin is warm and dry. Capillary refill takes less than 2 seconds. No rash noted.   Psychiatric: She has a normal mood and affect. Her behavior is normal.   Nursing note and vitals reviewed.       ASSESSMENT:  There are no diagnoses linked to this encounter.      REFERRALS:  Neurology       LABS and/or ADDITIONAL TESTS NEEDED:  No orders of the defined types were placed in this encounter.      DURABLE MEDICAL EQUIPMENT (DME):  None Needed    COMMUNITY RESOURCES IDENTIFIED FOR PATIENT/FAMILY:  None Needed.    ADDITIONAL COMMUNICATION DELIVERED OR PLANNED:  Please refer to AVS for specifics of Patient Education, Updated Medication List, Handouts, Referrals. As necessary, information was made available or sent to Specialists or members of Care Team.    COMPLEXITY OF MEDICAL DECISION MAKING:  High Complexity (67517)  (Considerations: Number of diagnoses, number of management options considered, amount and/or complexity of medical records/diagnostic tests, and/or other information obtained/reviewed/analyzed. Risk of significant complications, morbidity, and/or mortality as well as co-morbidities associated with presenting problems, diagnostic procedures, and/or the possible management options).

## 2020-01-14 NOTE — PATIENT INSTRUCTIONS
Subarachnoid Hemorrhage  Subarachnoid hemorrhage is bleeding in the area between the brain and the membrane that covers the brain (subarachnoid space). This bleeding increases the pressure on the brain and decreases blood flow to certain areas of the brain. Subarachnoid hemorrhage is a medical emergency. If this condition is not treated, it may cause permanent brain damage, stroke, or even death.  What are the causes?  This condition may be caused by:  · A burst blood vessel in the brain (ruptured brain aneurysm).  · A head injury.  · Bleeding from blood vessels that have developed abnormally (arteriovenous malformation).  · A bleeding disorder.  · Use of blood-thinning medicines (anticoagulants).  · Use of certain drugs, such as cocaine.  In some cases, the cause is not known.  What increases the risk?  You are more likely to develop this condition if:  · You smoke.  · You have high blood pressure (hypertension).  · You abuse alcohol.  · You are older than age 50.  · You are female, especially if you have gone through menopause.  · You have a family history of aneurysms.  · You have a certain genetic syndrome that results in kidney disease (autosomal dominant polycystic kidney disease) or connective tissue disease.  What are the signs or symptoms?  Symptoms of this condition include:  · A sudden, severe headache. The headache is often described as the worst headache ever experienced.  · Nausea or vomiting, especially when combined with other symptoms such as a headache.  · Sudden weakness or numbness of the face, arm, or leg, especially on one side of the body.  · Sudden trouble walking or difficulty moving an arm or leg.  · Sudden confusion.  · Trouble speaking (expressive aphasia) or understanding speech (receptive aphasia).  · Difficulty swallowing.  · Sudden trouble seeing out of one or both eyes.  · Double vision.  · Dizziness.  · Loss of balance or coordination.  · Sensitivity to light.  · Stiff  neck.  · Drowsiness.  · Loss of consciousness.  How is this diagnosed?  This condition is diagnosed based on a physical exam and your symptoms. You may have tests, such as:  · CT scan.  · MRI.  · A procedure to examine the blood vessels in the brain and neck (cerebral angiogram). For this test, a dye is injected into your bloodstream before X-rays are taken to examine blood flow. Dye makes the blood vessels easier to see.  · A procedure to remove and examine a small amount of the fluid that surrounds the brain and spinal cord (lumbar puncture).  · Blood tests.  · An ultrasound to monitor blood flow in the brain (transcranial Doppler).  How is this treated?  This condition often requires immediate treatment in the hospital to lower the risk of brain damage. Treatment depends on the cause, severity, and location of the bleeding and any damage that it has caused. Treatment goals are to stop bleeding, repair the cause of bleeding, relieve symptoms, and prevent problems. Treatment may include:  · Medicines that:  ? Reverse the effects of blood thinners, if you were taking blood thinners before the subarachnoid hemorrhage.  ? Lower the blood pressure (antihypertensives).  ? Relieve pain (analgesics).  ? Relieve nausea or vomiting.  ? Prevent seizures.  · Surgery to stop bleeding, repair the cause of the bleeding, or remove blood that has collected. This may involve:  ? A procedure that is done from inside the blood vessel, in which the aneurysm is filled with small, platinum coils (endovascular coiling).  ? Opening the skull (craniotomy) to reach the aneurysm and put a clip at the base of the aneurysm (surgical clipping).  · Surgery to relieve pressure on the brain by placing a tube (external ventricular drain, EVD) in the brain to drain blood.  · Physical, occupational, or speech-language therapy to improve any mental (cognitive) and day-to-day functions that are affected by your condition.  Other treatment depends on  "the cause and severity of symptoms, and how long the symptoms have lasted. Actions may be taken to prevent short-term and long-term problems, including lung infection (pneumonia) and blood clots in your legs.  Follow these instructions at home:  Medicines  · Take over-the-counter and prescription medicines only as told by your health care provider.  · Do not take any medicines that contain aspirin or NSAIDs unless your health care provider approves.  Lifestyle  · Do not use any products that contain nicotine or tobacco, such as cigarettes and e-cigarettes. If you need help quitting, ask your health care provider.  · Limit alcohol intake to no more than 1 drink a day for nonpregnant women and 2 drinks a day for men. One drink equals 12 oz of beer, 5 oz of wine, or 1½ oz of hard liquor.  Eating and drinking  · Follow instructions from your health care provider about whether eating and drinking are safe for you. You may need tests to make sure that you can swallow safely (swallow studies).  Driving  · Do not drive until your health care provider approves.  · Do not drive or use heavy machinery while taking prescription pain medicine.  General instructions  · Do physical, occupational, and speech-language therapy as recommended.  · Rest and limit activities as directed. Rest helps the brain to heal. Make sure you:  ? Get plenty of sleep.  ? Avoid activities that cause physical or mental stress.  · Check and write down your blood pressure as told by your health care provider.  · Keep all follow-up visits as told by your health care providers. This is important.  Contact a health care provider if:  · You have a stiff neck.  · You have a cough.  · You have a fever.  Get help right away if:  · You have any symptoms of a stroke. \"BE FAST\" is an easy way to remember the main warning signs of a stroke:  ? B - Balance. Signs are dizziness, sudden trouble walking, or loss of balance.  ? E - Eyes. Signs are trouble seeing or a " sudden change in vision.  ? F - Face. Signs are sudden weakness or numbness of the face, or the face or eyelid drooping on one side.  ? A - Arms. Signs are weakness or numbness in an arm. This happens suddenly and usually on one side of the body.  ? S - Speech. Signs are sudden trouble speaking, slurred speech, or trouble understanding what people say.  ? T - Time. Time to call emergency services. Write down what time symptoms started.  · You have other signs of a stroke, such as:  ? A sudden, severe headache with no known cause.  ? Nausea or vomiting.  ? Seizure.  These symptoms may represent a serious problem that is an emergency. Do not wait to see if the symptoms will go away. Get medical help right away. Call your local emergency services (911 in the [place] Do not drive yourself to the hospital.  Summary  · Subarachnoid hemorrhage is bleeding in the area between the brain and the membrane that covers the brain (subarachnoid space).  · Subarachnoid hemorrhage is a medical emergency. Treatment may include procedures to stop bleeding or reduce pressure in the skull, and medicines to prevent complications.  · Follow instructions from your health care provider about diet restrictions, activity restrictions, and medicines.  This information is not intended to replace advice given to you by your health care provider. Make sure you discuss any questions you have with your health care provider.  Document Released: 11/04/2005 Document Revised: 09/27/2018 Document Reviewed: 09/27/2018  [employer] Patient Education © 2019 [employer]

## 2020-01-16 LAB
INR PPP: NORMAL
PROTHROMBIN TIME: NORMAL S

## 2020-01-28 DIAGNOSIS — E03.9 HYPOTHYROIDISM, UNSPECIFIED TYPE: ICD-10-CM

## 2020-01-28 RX ORDER — LEVOTHYROXINE SODIUM 0.05 MG/1
50 TABLET ORAL DAILY
Qty: 30 TABLET | Refills: 5
Start: 2020-01-28 | End: 2020-02-03

## 2020-02-03 DIAGNOSIS — E03.9 HYPOTHYROIDISM, UNSPECIFIED TYPE: ICD-10-CM

## 2020-02-03 RX ORDER — LEVOTHYROXINE SODIUM 0.05 MG/1
TABLET ORAL
Qty: 90 TABLET | Refills: 1 | Status: SHIPPED | OUTPATIENT
Start: 2020-02-03 | End: 2020-05-26

## 2020-02-06 ENCOUNTER — TELEPHONE (OUTPATIENT)
Dept: FAMILY MEDICINE CLINIC | Facility: CLINIC | Age: 20
End: 2020-02-06

## 2020-02-06 NOTE — TELEPHONE ENCOUNTER
Kieran with [employer] called, left message. He is requesting phone call for additional diagnosis codes.

## 2020-02-11 NOTE — TELEPHONE ENCOUNTER
Disregard at this time, Pt is going to [facility] for her INR's bc [place] has given her such a hard time about the whole situation.

## 2020-02-17 ENCOUNTER — TELEPHONE (OUTPATIENT)
Dept: FAMILY MEDICINE CLINIC | Facility: CLINIC | Age: 20
End: 2020-02-17

## 2020-02-17 DIAGNOSIS — E03.9 HYPOTHYROIDISM, UNSPECIFIED TYPE: ICD-10-CM

## 2020-02-17 DIAGNOSIS — E78.5 HYPERLIPIDEMIA, UNSPECIFIED HYPERLIPIDEMIA TYPE: ICD-10-CM

## 2020-02-17 DIAGNOSIS — I61.9 INTRAPARENCHYMAL HEMORRHAGE OF BRAIN (HCC): ICD-10-CM

## 2020-02-17 DIAGNOSIS — R53.83 FATIGUE, UNSPECIFIED TYPE: Primary | ICD-10-CM

## 2020-02-17 DIAGNOSIS — R00.0 TACHYCARDIA: ICD-10-CM

## 2020-02-17 NOTE — TELEPHONE ENCOUNTER
Pt mother called, stated that she had recently spoken with Mrs. Dickson regarding a referral to Cardiology for Pt. She is asking if this can be completed? They do not have a preference concerning where appt is located.

## 2020-02-26 ENCOUNTER — OFFICE VISIT (OUTPATIENT)
Dept: CARDIOLOGY | Facility: CLINIC | Age: 20
End: 2020-02-26

## 2020-02-26 VITALS
BODY MASS INDEX: 22.6 KG/M2 | WEIGHT: 144 LBS | OXYGEN SATURATION: 96 % | HEIGHT: 67 IN | SYSTOLIC BLOOD PRESSURE: 102 MMHG | HEART RATE: 131 BPM | DIASTOLIC BLOOD PRESSURE: 80 MMHG

## 2020-02-26 DIAGNOSIS — E78.2 MIXED HYPERLIPIDEMIA: ICD-10-CM

## 2020-02-26 DIAGNOSIS — L30.9 ECZEMA, UNSPECIFIED TYPE: ICD-10-CM

## 2020-02-26 DIAGNOSIS — R00.0 TACHYCARDIA: ICD-10-CM

## 2020-02-26 DIAGNOSIS — E03.9 HYPOTHYROIDISM, UNSPECIFIED TYPE: ICD-10-CM

## 2020-02-26 DIAGNOSIS — R00.0 SINUS TACHYCARDIA: ICD-10-CM

## 2020-02-26 DIAGNOSIS — E04.1 THYROID NODULE: ICD-10-CM

## 2020-02-26 DIAGNOSIS — I61.9 INTRAPARENCHYMAL HEMORRHAGE OF BRAIN (HCC): ICD-10-CM

## 2020-02-26 DIAGNOSIS — G08 THROMBOSIS OF SUPERIOR SAGITTAL SINUS: Primary | ICD-10-CM

## 2020-02-26 PROCEDURE — 93000 ELECTROCARDIOGRAM COMPLETE: CPT | Performed by: INTERNAL MEDICINE

## 2020-02-26 PROCEDURE — 99204 OFFICE O/P NEW MOD 45 MIN: CPT | Performed by: INTERNAL MEDICINE

## 2020-02-26 RX ORDER — WARFARIN SODIUM 2.5 MG/1
TABLET ORAL
COMMUNITY
Start: 2020-01-22 | End: 2021-08-25

## 2020-02-26 NOTE — PROGRESS NOTES
Aydin Thorpe  [id number]  2000  19 y.o.  female    Referring Provider: Vamsi Tucker MD    Reason for  Visit:  Initial visit for palpitations   Recent sagittal vein thrombosis and was admitted to [place]  Intracranial hemorrhage   Treated conservatively  on anticoagulation  Unprovoked thrombosis   cerebrovascular accident recovered and still doing physical therapy    Subjective    Mild chronic exertional shortness of breath on exertion relieved with rest  No significant cough or wheezing    No palpitations  No associated chest pain  No significant pedal edema    No fever or chills  No significant expectoration    No hemoptysis  No presyncope or syncope     She wants to be seen by Neurosurgery and Neurologist in [place]     History of present illness:  Aydin Thorpe is a 19 y.o. yo female with history of sagittal vein thrombosis  who presents today for   Chief Complaint   Patient presents with   • Rapid Heart Rate     NEW PT   • Stroke     1/2020 - ON COUMADIN - [place] MONITORS   .    History  Past Medical History:   Diagnosis Date   • Asthma    • Chronic eczematous otitis externa of both ears    • Hyperlipidemia    • Stroke (CMS/HCC)     1/2020   ,   Past Surgical History:   Procedure Laterality Date   • NO PAST SURGERIES     ,   Family History   Problem Relation Age of Onset   • Osteoarthritis Mother    • Asthma Mother    • Rheum arthritis Father    • Asthma Father    • Heart disease Father    • No Known Problems Maternal Grandfather    • No Known Problems Paternal Grandmother    • No Known Problems Paternal Grandfather    ,   Social History     Tobacco Use   • Smoking status: Never Smoker   • Smokeless tobacco: Never Used   Substance Use Topics   • Alcohol use: No   • Drug use: No   ,     Medications  Current Outpatient Medications   Medication Sig Dispense Refill   • ALBUTEROL SULFATE  (90 Base) MCG/ACT inhaler INHALE TWO PUFFS BY MOUTH FOUR TIMES A DAY AS NEEDED 18 g 5   •  atorvastatin (LIPITOR) 40 MG tablet Take 40 mg by mouth Daily.     • betamethasone dipropionate (DIPROLENE) 0.05 % cream Apply  topically to the appropriate area as directed 2 (Two) Times a Day. 15 g 2   • dexamethasone (DECADRON) 0.75 MG tablet Take 1 tablet by mouth Daily With Breakfast. 30 tablet 5   • hydrOXYzine (ATARAX) 25 MG tablet TAKE ONE TABLET BY MOUTH TWICE A DAY 60 tablet 5   • hydrOXYzine pamoate (VISTARIL) 25 MG capsule Take 1 capsule by mouth 3 (Three) Times a Day As Needed for Itching. 60 capsule 5   • levothyroxine (SYNTHROID, LEVOTHROID) 50 MCG tablet TAKE ONE TABLET BY MOUTH EVERY DAY 90 tablet 1   • montelukast (SINGULAIR) 10 MG tablet TAKE ONE TABLET BY MOUTH EVERY EVENING 90 tablet 1   • warfarin (COUMADIN) 2.5 MG tablet Take 1/2 to 1 tablet by mouth daily or dose per direction of Anticoagulation Clinic.       No current facility-administered medications for this visit.        Allergies:  Patient has no known allergies.    Review of Systems  Review of Systems   Constitution: Negative. Negative for diaphoresis and fever.   HENT: Negative.    Eyes: Negative.    Cardiovascular: Negative for chest pain, claudication, cyanosis, dyspnea on exertion, irregular heartbeat, leg swelling, near-syncope, orthopnea, palpitations, paroxysmal nocturnal dyspnea and syncope.   Respiratory: Negative.  Negative for shortness of breath.    Endocrine: Negative.    Hematologic/Lymphatic: Negative.    Skin: Negative.    Musculoskeletal: Negative for back pain and neck pain.   Gastrointestinal: Negative for abdominal pain, anorexia, bowel incontinence, nausea and vomiting.   Genitourinary: Negative.  Negative for bladder incontinence.   Neurological: Negative.  Negative for dizziness, focal weakness, headaches, light-headedness, loss of balance, vertigo and weakness.   Psychiatric/Behavioral: Negative.  Negative for altered mental status and memory loss.       Objective     Physical Exam:  /80   Pulse (!) 131   " Ht 170.2 cm (67\")   Wt 65.3 kg (144 lb)   SpO2 96%   BMI 22.55 kg/m²     Physical Exam   Constitutional: She appears well-developed.   HENT:   Head: Normocephalic.   Neck: Normal carotid pulses and no JVD present. No tracheal tenderness present. Carotid bruit is not present. No tracheal deviation and no edema present.   Cardiovascular: Regular rhythm, normal heart sounds and normal pulses.   Pulmonary/Chest: Effort normal. No stridor.   Abdominal: Soft. She exhibits no distension. There is no hepatosplenomegaly. There is no tenderness.   Neurological: She is alert. She has normal strength. No cranial nerve deficit or sensory deficit.   Skin: Skin is warm.   Psychiatric: She has a normal mood and affect. Her speech is normal and behavior is normal.       Results Review:       ECG 12 Lead  Date/Time: 2/26/2020 12:14 PM  Performed by: Wan Turpin MD  Authorized by: Wan Turpin MD   Comparison: not compared with previous ECG   Rhythm: sinus tachycardia  Rate: tachycardic  Conduction: conduction normal  ST Segments: ST segments normal  T Waves: T waves normal  QRS axis: normal  Other: no other findings    Clinical impression: abnormal EKG            Assessment/Plan   Aydin was seen today for rapid heart rate and stroke.    Diagnoses and all orders for this visit:    Thrombosis of superior sagittal sinus  -     Ambulatory Referral to Hematology  -     Ambulatory Referral to Neurosurgery  -     Ambulatory Referral to Neurology    Thyroid nodule    Tachycardia    Intraparenchymal hemorrhage of brain (CMS/HCC)  -     Ambulatory Referral to Neurosurgery  -     Ambulatory Referral to Neurology    Mixed hyperlipidemia    Eczema, unspecified type    Hypothyroidism, unspecified type    Sinus tachycardia  -     Adult Transthoracic Echo Complete W/ Cont if Necessary Per Protocol; Future    Other orders  -     ECG 12 Lead           Plan      Get 30 day monitor report from [facility]     Orders Placed This Encounter "   Procedures   • Ambulatory Referral to Hematology     Referral Priority:   Routine     Referral Type:   Consultation     Referral Reason:   Specialty Services Required     Requested Specialty:   Hematology     Number of Visits Requested:   1   • Ambulatory Referral to Neurosurgery     Referral Priority:   Routine     Referral Type:   Consultation     Referral Reason:   Specialty Services Required     Requested Specialty:   Neurosurgery     Number of Visits Requested:   1   • Ambulatory Referral to Neurology     Referral Priority:   Routine     Referral Type:   Consultation     Referral Reason:   Specialty Services Required     Requested Specialty:   Neurology     Number of Visits Requested:   1   • ECG 12 Lead     This order was created via procedure documentation   • Adult Transthoracic Echo Complete W/ Cont if Necessary Per Protocol     Standing Status:   Future     Standing Expiration Date:   2/25/2021     Order Specific Question:   Reason for exam?     Answer:   Palpitations    The current medical regimen is effective;  continue present plan and medications.     ____________________________________________________________________________________________________________________________________________  Health maintenance and recommendations      Low vitamin K diet.  knowing what to eat, tips what foods to avoid. Printed dietary instructions with food items and Vitamin K content with websites provided.  Target INR 2-3      Heart healthy carbohydrate restricted cardiac diet   The patient is advised to reduce or avoid caffeine or other cardiac stimulants.     Minimize or avoid  NSAID-type medications        Monitor for any signs of bleeding including red or dark stools. Fall precautions.        Advised staying uptodate with immunizations per established standard guidelines.      Offered to give patient  a copy of my notes       Questions were encouraged, asked and answered to the patient's  understanding and  satisfaction. Questions if any regarding current medications and side effects, need for refills and importance of compliance to medications stressed.    Reviewed available prior notes, consults, prior visits, laboratory findings, radiology and cardiology relevant reports. Updated chart as applicable. I have reviewed the patient's medical history in detail and updated the computerized patient record as relevant.      Updated patient regarding any new or relevant abnormalities on review of records or any new findings on physical exam. Mentioned to patient about purpose of visit and desirable health short and long term goals and objectives.    Primary to monitor CBC CMP Lipid panel and TSH as applicable    ___________________________________________________________________________________________________________________________________________          Return in about 6 weeks (around 4/8/2020).

## 2020-03-06 ENCOUNTER — HOSPITAL ENCOUNTER (OUTPATIENT)
Dept: CARDIOLOGY | Facility: HOSPITAL | Age: 20
Discharge: HOME | End: 2020-03-06
Admitting: INTERNAL MEDICINE

## 2020-03-06 VITALS
DIASTOLIC BLOOD PRESSURE: 60 MMHG | HEIGHT: 67 IN | WEIGHT: 144 LBS | BODY MASS INDEX: 22.6 KG/M2 | SYSTOLIC BLOOD PRESSURE: 120 MMHG

## 2020-03-06 DIAGNOSIS — R00.0 SINUS TACHYCARDIA: ICD-10-CM

## 2020-03-06 PROCEDURE — 93306 TTE W/DOPPLER COMPLETE: CPT

## 2020-03-06 PROCEDURE — 93306 TTE W/DOPPLER COMPLETE: CPT | Performed by: INTERNAL MEDICINE

## 2020-03-08 LAB
BH CV ECHO MEAS - AO MAX PG (FULL): 4.2 MMHG
BH CV ECHO MEAS - AO MAX PG: 8.2 MMHG
BH CV ECHO MEAS - AO MEAN PG (FULL): 2 MMHG
BH CV ECHO MEAS - AO MEAN PG: 4 MMHG
BH CV ECHO MEAS - AO ROOT AREA (BSA CORRECTED): 1.5
BH CV ECHO MEAS - AO ROOT AREA: 5.3 CM^2
BH CV ECHO MEAS - AO ROOT DIAM: 2.6 CM
BH CV ECHO MEAS - AO V2 MAX: 143 CM/SEC
BH CV ECHO MEAS - AO V2 MEAN: 88.7 CM/SEC
BH CV ECHO MEAS - AO V2 VTI: 24 CM
BH CV ECHO MEAS - AVA(I,A): 2.4 CM^2
BH CV ECHO MEAS - AVA(I,D): 2.4 CM^2
BH CV ECHO MEAS - AVA(V,A): 2.2 CM^2
BH CV ECHO MEAS - AVA(V,D): 2.2 CM^2
BH CV ECHO MEAS - BSA(HAYCOCK): 1.8 M^2
BH CV ECHO MEAS - BSA: 1.8 M^2
BH CV ECHO MEAS - BZI_BMI: 22.6 KILOGRAMS/M^2
BH CV ECHO MEAS - BZI_METRIC_HEIGHT: 170.2 CM
BH CV ECHO MEAS - BZI_METRIC_WEIGHT: 65.3 KG
BH CV ECHO MEAS - EDV(CUBED): 88.1 ML
BH CV ECHO MEAS - EDV(MOD-SP4): 61.8 ML
BH CV ECHO MEAS - EDV(TEICH): 90.1 ML
BH CV ECHO MEAS - EF(CUBED): 69.7 %
BH CV ECHO MEAS - EF(MOD-SP4): 60.7 %
BH CV ECHO MEAS - EF(TEICH): 61.4 %
BH CV ECHO MEAS - ESV(CUBED): 26.7 ML
BH CV ECHO MEAS - ESV(MOD-SP4): 24.3 ML
BH CV ECHO MEAS - ESV(TEICH): 34.7 ML
BH CV ECHO MEAS - FS: 32.8 %
BH CV ECHO MEAS - IVS/LVPW: 0.9
BH CV ECHO MEAS - IVSD: 0.75 CM
BH CV ECHO MEAS - LA DIMENSION: 2.8 CM
BH CV ECHO MEAS - LA/AO: 1.1
BH CV ECHO MEAS - LAT PEAK E' VEL: 17.3 CM/SEC
BH CV ECHO MEAS - LV DIASTOLIC VOL/BSA (35-75): 35.1 ML/M^2
BH CV ECHO MEAS - LV MASS(C)D: 110.7 GRAMS
BH CV ECHO MEAS - LV MASS(C)DI: 63 GRAMS/M^2
BH CV ECHO MEAS - LV MAX PG: 4 MMHG
BH CV ECHO MEAS - LV MEAN PG: 2 MMHG
BH CV ECHO MEAS - LV SYSTOLIC VOL/BSA (12-30): 13.8 ML/M^2
BH CV ECHO MEAS - LV V1 MAX: 100 CM/SEC
BH CV ECHO MEAS - LV V1 MEAN: 63.7 CM/SEC
BH CV ECHO MEAS - LV V1 VTI: 18.6 CM
BH CV ECHO MEAS - LVIDD: 4.5 CM
BH CV ECHO MEAS - LVIDS: 3 CM
BH CV ECHO MEAS - LVLD AP4: 8.2 CM
BH CV ECHO MEAS - LVLS AP4: 7.2 CM
BH CV ECHO MEAS - LVOT AREA (M): 3.1 CM^2
BH CV ECHO MEAS - LVOT AREA: 3.1 CM^2
BH CV ECHO MEAS - LVOT DIAM: 2 CM
BH CV ECHO MEAS - LVPWD: 0.84 CM
BH CV ECHO MEAS - MED PEAK E' VEL: 14 CM/SEC
BH CV ECHO MEAS - MV A MAX VEL: 75.2 CM/SEC
BH CV ECHO MEAS - MV DEC SLOPE: 442.5 CM/SEC^2
BH CV ECHO MEAS - MV DEC TIME: 0.23 SEC
BH CV ECHO MEAS - MV E MAX VEL: 88.8 CM/SEC
BH CV ECHO MEAS - MV E/A: 1.2
BH CV ECHO MEAS - MV P1/2T MAX VEL: 109 CM/SEC
BH CV ECHO MEAS - MV P1/2T: 72.1 MSEC
BH CV ECHO MEAS - MVA P1/2T LCG: 2 CM^2
BH CV ECHO MEAS - MVA(P1/2T): 3 CM^2
BH CV ECHO MEAS - PA MAX PG: 6.2 MMHG
BH CV ECHO MEAS - PA V2 MAX: 124 CM/SEC
BH CV ECHO MEAS - SI(AO): 72.5 ML/M^2
BH CV ECHO MEAS - SI(CUBED): 34.9 ML/M^2
BH CV ECHO MEAS - SI(LVOT): 33.2 ML/M^2
BH CV ECHO MEAS - SI(MOD-SP4): 21.3 ML/M^2
BH CV ECHO MEAS - SI(TEICH): 31.5 ML/M^2
BH CV ECHO MEAS - SV(AO): 127.4 ML
BH CV ECHO MEAS - SV(CUBED): 61.4 ML
BH CV ECHO MEAS - SV(LVOT): 58.4 ML
BH CV ECHO MEAS - SV(MOD-SP4): 37.5 ML
BH CV ECHO MEAS - SV(TEICH): 55.3 ML
BH CV ECHO MEASUREMENTS AVERAGE E/E' RATIO: 5.67
LEFT ATRIUM VOLUME INDEX: 17.3 ML/M2
LV EF 2D ECHO EST: 60 %
MAXIMAL PREDICTED HEART RATE: 199 BPM
STRESS TARGET HR: 169 BPM

## 2020-04-01 NOTE — PROGRESS NOTES
[facility]  [address] [facility] [address]  [phone]    Patient Name: Aydin Thorpe  Encounter Date: 04/02/2020  YOB: 2000  Patient Number: [id number]    Initial Note    REASON FOR CONSULTATION: Aydin Thorpe is a pleasant 19 y.o.  female referred by Wan Turpin MD for management recommendations for thrombosis of the superior sagittal sinus extending into transverse sinus and hemorrhagic infarcts.  She is on Coumadin since 01/04/2020.  She is seen with mother, Sonia. History is obtained from patient. History is considered to be accurate.    HISTORY OF PRESENT ILLNESS: She presented with cough and chest pain 12/28/2019.  She was given antibiotic.    She developed headaches 12/30/2019, accompanied by vomiting.    She had left-sided weakness 01/02/2020 at [facility].  CT head concerning for intraparenchymal hemorrhage.  She was transferred to [place].  She is on oral contraceptive for menstrual irregularity.    Chest x-ray 01/02/2020 showed no acute airspace disease at [place].    CT head 01/02/2020 showed dural venous sinus thrombosis.  Right parietal lobe hemorrhage.  Subarachnoid hemorrhage in the bilateral frontal convexities.  Intraventricular hemorrhage in the right occipital horn.  1.6 cm thyroid nodule.    CT venography 01/02/2020 showed dural venous sinus thrombosis.  Right parietal lobe hemorrhage.  Subarachnoid hemorrhage in the bilateral frontal convexities.  Intraventricular hemorrhage in the right occipital horn.  1.6 cm thyroid nodule.    Note of Dr. Vianney Bryant 01/08/2020, given the hemorrhage secondary to dural venous thrombosis, patient was initiated on low-dose heparin drip with close monitoring.  Dr. Cipriano Marquez, neurosurgery, was consulted and recommended nonsurgical management with anticoagulation. Warfarin was initiated 01/04/2020 with a baseline INR of 1.1.  Transition  heparin drip to Lovenox on 01/06/2020.  She was discharged 01/08/2020 with INR of 2.5.  INR range is 2-3.  Plan to anticoagulate for 6 months, reimage at [place] and check hypercoagulable panel then.     Past history negative for head trauma, inflammatory disease, or malignancy.     Past history negative for miscarriages or thrombosis.    No family history of hypercoagulable state.    Social history negative for tobacco use.    With the above background, she is seen.      LABS    No results for input(s): HGB, HCT, MCV, WBC, RDW, MPV, PLT, AUTOIGPER, NEUTROABS, LYMPHSABS, MONOSABS, EOSABS, BASOSABS, AUTOIGNUM, NRBC, NEUTRELPCTM, MONOPCT, BASOPCT, ATYLMPCT, ANISOCYTOSIS, GIANTPLTS in the last 36728 hours.    Invalid input(s): LYMPHOCPCT, ABCMORPH    No results for input(s): GLUCOSE, NA, K, CO2, CL, ANIONGAP, CREATININE, BUN, BCR, CALCIUM, EGFRIFNONA, ALKPHOS, PROTEINTOT, ALT, AST, BILITOT, ALBUMIN, GLOB in the last 16013 hours.    Invalid input(s): LABIL    No results for input(s): MSPIKE, KAPPALAMB, IGLFLC, URICACID, FREEKAPPAL, CEA, LDH, REFLABREPO in the last 03047 hours.    No results for input(s): IRON, TIBC, LABIRON, FERRITIN, P6OMQRA, TSH, FOLATE in the last 20326 hours.    Invalid input(s): VITB12      PAST MEDICAL HISTORY:  ALLERGIES:  No Known Allergies  CURRENT MEDICATIONS:  Outpatient Encounter Medications as of 4/8/2020   Medication Sig Dispense Refill   • ALBUTEROL SULFATE  (90 Base) MCG/ACT inhaler INHALE TWO PUFFS BY MOUTH FOUR TIMES A DAY AS NEEDED 18 g 5   • atorvastatin (LIPITOR) 40 MG tablet Take 40 mg by mouth Daily.     • betamethasone dipropionate (DIPROLENE) 0.05 % cream Apply  topically to the appropriate area as directed 2 (Two) Times a Day. 15 g 2   • dexamethasone (DECADRON) 0.75 MG tablet Take 1 tablet by mouth Daily With Breakfast. 30 tablet 5   • hydrOXYzine (ATARAX) 25 MG tablet TAKE ONE TABLET BY MOUTH TWICE A DAY 60 tablet 5   • hydrOXYzine pamoate (VISTARIL) 25 MG capsule  Take 1 capsule by mouth 3 (Three) Times a Day As Needed for Itching. 60 capsule 5   • levothyroxine (SYNTHROID, LEVOTHROID) 50 MCG tablet TAKE ONE TABLET BY MOUTH EVERY DAY 90 tablet 1   • montelukast (SINGULAIR) 10 MG tablet TAKE ONE TABLET BY MOUTH EVERY EVENING 90 tablet 1   • warfarin (COUMADIN) 2.5 MG tablet Take 1/2 to 1 tablet by mouth daily or dose per direction of Anticoagulation Clinic.       No facility-administered encounter medications on file as of 4/8/2020.      ADULT ILLNESSES:  Patient Active Problem List   Diagnosis Code   • Asthma J45.909   • Bronchitis J40   • Conjunctivitis H10.9   • Eczema L30.9   • HLD (hyperlipidemia) E78.5   • Hypothyroid E03.9   • Intraparenchymal hemorrhage of brain (CMS/HCC) I61.9   • Long term (current) use of anticoagulants Z79.01   • Tachycardia R00.0   • Thrombosis of superior sagittal sinus G08   • Thyroid nodule E04.1     SURGERIES:  Past Surgical History:   Procedure Laterality Date   • NO PAST SURGERIES       HEALTH MAINTENANCE ITEMS:  Health Maintenance Due   Topic Date Due   • TDAP/TD VACCINES (1 - Tdap) 07/17/2009   • HEPATITIS C SCREENING  11/16/2018   • CHLAMYDIA SCREENING  11/16/2018   • PAP SMEAR  07/17/2019   • ANNUAL PHYSICAL  11/16/2019       <no information>  Last Completed Colonoscopy     Patient has no health maintenance due at this time          There is no immunization history on file for this patient.  Last Completed Mammogram     Patient has no health maintenance due at this time            FAMILY HISTORY:  Family History   Problem Relation Age of Onset   • Osteoarthritis Mother    • Asthma Mother    • Rheum arthritis Father    • Asthma Father    • Heart disease Father    • No Known Problems Maternal Grandfather    • No Known Problems Paternal Grandmother    • No Known Problems Paternal Grandfather      SOCIAL HISTORY:  Social History     Socioeconomic History   • Marital status: Single     Spouse name: Not on file   • Number of children: Not on  "file   • Years of education: Not on file   • Highest education level: Not on file   Tobacco Use   • Smoking status: Never Smoker   • Smokeless tobacco: Never Used   Substance and Sexual Activity   • Alcohol use: No   • Drug use: No   • Sexual activity: Defer       REVIEW OF SYSTEMS:  Review of Systems   Constitutional: Negative for activity change, chills, diaphoresis, fatigue, fever and unexpected weight loss.   HENT: Negative for congestion, nosebleeds, sinus pressure and trouble swallowing.    Eyes: Negative for blurred vision, redness and visual disturbance.   Respiratory: Negative for cough, shortness of breath and wheezing.    Cardiovascular: Negative for chest pain, palpitations and leg swelling.   Gastrointestinal: Negative for abdominal pain, blood in stool, constipation, diarrhea, nausea and vomiting.   Endocrine: Negative for cold intolerance and heat intolerance.   Genitourinary: Negative for dysuria, frequency, hematuria, urgency, urinary incontinence and vaginal bleeding.   Musculoskeletal: Negative for arthralgias, joint swelling and neck stiffness.   Skin: Negative for pallor.   Allergic/Immunologic: Negative for food allergies.   Neurological: Negative for dizziness, tremors, seizures, syncope, speech difficulty, weakness, headache, memory problem and confusion.   Hematological: Negative for adenopathy. Does not bruise/bleed easily.   Psychiatric/Behavioral: Negative for agitation, behavioral problems, dysphoric mood, sleep disturbance, suicidal ideas and depressed mood.       /84   Pulse 116   Temp 98.3 °F (36.8 °C) (Temporal)   Resp 16   Ht 170.2 cm (67\")   Wt 63.6 kg (140 lb 4.8 oz)   SpO2 98%   Breastfeeding No   BMI 21.97 kg/m²  Body surface area is 1.74 meters squared.  Pain Score    04/08/20 1036   PainSc: 0-No pain       Physical Exam:  Physical Exam   Constitutional: She is oriented to person, place, and time. She appears well-developed and well-nourished. No distress.   HENT: "   Head: Normocephalic and atraumatic.   Eyes: EOM are normal. No scleral icterus.   Neck: Trachea normal. Neck supple.   Cardiovascular: Normal pulses.   No murmur heard.  Tachycardic.    Pulmonary/Chest: Effort normal and breath sounds normal. She has no wheezes. She has no rhonchi. She has no rales. Chest wall is not dull to percussion.   Abdominal: Soft. Normal appearance and bowel sounds are normal. There is no tenderness. There is no rebound and no guarding.   Musculoskeletal: She exhibits no edema.   Lymphadenopathy:     She has no cervical adenopathy.     She has no axillary adenopathy.        Right: No inguinal and no supraclavicular adenopathy present.        Left: No inguinal and no supraclavicular adenopathy present.   Neurological: She is alert and oriented to person, place, and time. She has normal strength. No sensory deficit.   Skin: Skin is warm and dry. She is not diaphoretic. No pallor.   Psychiatric: She has a normal mood and affect. Her behavior is normal. Judgment and thought content normal.   Vitals reviewed.      Aydin Thorpe reports a pain score of 0.        Patient's Body mass index is 21.97 kg/m². BMI is within normal parameters. No follow-up required..    ASSESSMENT:   1.  Superior sagittal sinus thrombosis extending into the transverse sinus.  2.  Hemorrhagic infarct, history of.  3.  Hypothyroidism. Stable on Synthroid.  4.  Hyperlipidemia. Stable on Lipitor  5.  Thyroid nodule. Per PCP.  6.  Sinus tachycardia followed by Dr. Turpin.      PLAN:  1.   regarding the reason for the referral.  2.   regarding recommendations from [place].  Hypercoagulable work-up after 6 months of anticoagulation.  3.  Continue Coumadin per PCP.  Target INR 2-3.  Observe for bleeding complications.  4.  Blood for CBC with differential and CMP.  5.  Continue current medications.  6.  Continue care per primary care physician and other specialists.  7.  Plan of care discussed with patient and  "mother.  Understanding expressed.  Patient able to proceed..  8.  Return to office 2 weeks after reevaluation at [place] with pre-office CBC with differential and CMP.  Patient to call the office.   9.  Further recommendations pending.  10.  Questions were answered to their satisfaction.  \"We are clear.\"      Thank you for the referral.      I spent 47 total minutes, face-to-face, caring for Aydin today.  Greater than 50% of this time involved counseling and/or coordination of care as documented within this note regarding the patient's illness(es), pros and cons of various treatment options, instructions and/or risk reduction.      Vamsi Tucker MD  (Wan Turpin MD)  (Vianney Bryant MD)      "

## 2020-04-08 ENCOUNTER — TELEPHONE (OUTPATIENT)
Dept: ONCOLOGY | Facility: CLINIC | Age: 20
End: 2020-04-08

## 2020-04-08 ENCOUNTER — LAB (OUTPATIENT)
Dept: LAB | Facility: HOSPITAL | Age: 20
End: 2020-04-08

## 2020-04-08 ENCOUNTER — CONSULT (OUTPATIENT)
Dept: ONCOLOGY | Facility: CLINIC | Age: 20
End: 2020-04-08

## 2020-04-08 VITALS
WEIGHT: 140.3 LBS | HEIGHT: 67 IN | TEMPERATURE: 98.3 F | DIASTOLIC BLOOD PRESSURE: 84 MMHG | RESPIRATION RATE: 16 BRPM | BODY MASS INDEX: 22.02 KG/M2 | HEART RATE: 116 BPM | OXYGEN SATURATION: 98 % | SYSTOLIC BLOOD PRESSURE: 120 MMHG

## 2020-04-08 DIAGNOSIS — G08 THROMBOSIS OF SUPERIOR SAGITTAL SINUS: ICD-10-CM

## 2020-04-08 DIAGNOSIS — D72.829 LEUKOCYTOSIS, UNSPECIFIED TYPE: ICD-10-CM

## 2020-04-08 DIAGNOSIS — D72.829 LEUKOCYTOSIS, UNSPECIFIED TYPE: Primary | ICD-10-CM

## 2020-04-08 DIAGNOSIS — G08 THROMBOSIS OF SUPERIOR SAGITTAL SINUS: Primary | ICD-10-CM

## 2020-04-08 LAB
ALBUMIN SERPL-MCNC: 4.5 G/DL (ref 3.5–5.2)
ALBUMIN/GLOB SERPL: 1.5 G/DL
ALP SERPL-CCNC: 176 U/L (ref 39–117)
ALT SERPL W P-5'-P-CCNC: 15 U/L (ref 1–33)
ANION GAP SERPL CALCULATED.3IONS-SCNC: 12 MMOL/L (ref 5–15)
AST SERPL-CCNC: 13 U/L (ref 1–32)
BASOPHILS # BLD AUTO: 0.09 10*3/MM3 (ref 0–0.2)
BASOPHILS NFR BLD AUTO: 0.7 % (ref 0–1.5)
BILIRUB SERPL-MCNC: 0.4 MG/DL (ref 0.2–1.2)
BUN BLD-MCNC: 8 MG/DL (ref 6–20)
BUN/CREAT SERPL: 15.4 (ref 7–25)
CALCIUM SPEC-SCNC: 10.1 MG/DL (ref 8.6–10.5)
CHLORIDE SERPL-SCNC: 101 MMOL/L (ref 98–107)
CO2 SERPL-SCNC: 25 MMOL/L (ref 22–29)
CREAT BLD-MCNC: 0.52 MG/DL (ref 0.57–1)
DEPRECATED RDW RBC AUTO: 37.9 FL (ref 37–54)
EOSINOPHIL # BLD AUTO: 0.98 10*3/MM3 (ref 0–0.4)
EOSINOPHIL NFR BLD AUTO: 7.3 % (ref 0.3–6.2)
ERYTHROCYTE [DISTWIDTH] IN BLOOD BY AUTOMATED COUNT: 12.3 % (ref 12.3–15.4)
GFR SERPL CREATININE-BSD FRML MDRD: 149 ML/MIN/1.73
GLOBULIN UR ELPH-MCNC: 3 GM/DL
GLUCOSE BLD-MCNC: 90 MG/DL (ref 65–99)
HCT VFR BLD AUTO: 44.9 % (ref 34–46.6)
HGB BLD-MCNC: 15.1 G/DL (ref 12–15.9)
IMM GRANULOCYTES # BLD AUTO: 0.03 10*3/MM3 (ref 0–0.05)
IMM GRANULOCYTES NFR BLD AUTO: 0.2 % (ref 0–0.5)
LYMPHOCYTES # BLD AUTO: 2.96 10*3/MM3 (ref 0.7–3.1)
LYMPHOCYTES NFR BLD AUTO: 21.9 % (ref 19.6–45.3)
MCH RBC QN AUTO: 28.5 PG (ref 26.6–33)
MCHC RBC AUTO-ENTMCNC: 33.6 G/DL (ref 31.5–35.7)
MCV RBC AUTO: 84.7 FL (ref 79–97)
MONOCYTES # BLD AUTO: 1.04 10*3/MM3 (ref 0.1–0.9)
MONOCYTES NFR BLD AUTO: 7.7 % (ref 5–12)
NEUTROPHILS # BLD AUTO: 8.39 10*3/MM3 (ref 1.7–7)
NEUTROPHILS NFR BLD AUTO: 62.2 % (ref 42.7–76)
NRBC BLD AUTO-RTO: 0 /100 WBC (ref 0–0.2)
PLATELET # BLD AUTO: 457 10*3/MM3 (ref 140–450)
PMV BLD AUTO: 9.3 FL (ref 6–12)
POTASSIUM BLD-SCNC: 3.4 MMOL/L (ref 3.5–5.2)
PROT SERPL-MCNC: 7.5 G/DL (ref 6–8.5)
RBC # BLD AUTO: 5.3 10*6/MM3 (ref 3.77–5.28)
SODIUM BLD-SCNC: 138 MMOL/L (ref 136–145)
WBC NRBC COR # BLD: 13.49 10*3/MM3 (ref 3.4–10.8)

## 2020-04-08 PROCEDURE — 36415 COLL VENOUS BLD VENIPUNCTURE: CPT

## 2020-04-08 PROCEDURE — 81403 MOPATH PROCEDURE LEVEL 4: CPT

## 2020-04-08 PROCEDURE — 99204 OFFICE O/P NEW MOD 45 MIN: CPT | Performed by: INTERNAL MEDICINE

## 2020-04-08 PROCEDURE — 80053 COMPREHEN METABOLIC PANEL: CPT

## 2020-04-08 PROCEDURE — 85025 COMPLETE CBC W/AUTO DIFF WBC: CPT

## 2020-04-08 NOTE — TELEPHONE ENCOUNTER
----- Message from Jake Mondragon MD sent at 4/8/2020 12:45 PM CDT -----  Fax CMP to PCP, K 3.4 and elevated alkaline phosphatase.    Order JAK2 for leukocytosis and thrombocytosis.

## 2020-04-15 LAB
JAK2 EXONS 12-15 MUT DET PCR: NORMAL
LAB DIRECTOR NAME PROVIDER: NORMAL
LABORATORY COMMENT REPORT: NORMAL
Lab: NORMAL
METHOD: NORMAL

## 2020-05-22 DIAGNOSIS — E03.9 HYPOTHYROIDISM, UNSPECIFIED TYPE: ICD-10-CM

## 2020-05-26 RX ORDER — LEVOTHYROXINE SODIUM 0.05 MG/1
TABLET ORAL
Qty: 90 TABLET | Refills: 1 | Status: SHIPPED | OUTPATIENT
Start: 2020-05-26 | End: 2020-12-21

## 2020-06-09 DIAGNOSIS — J45.20 MILD INTERMITTENT ASTHMA WITHOUT COMPLICATION: ICD-10-CM

## 2020-06-10 RX ORDER — MONTELUKAST SODIUM 10 MG/1
TABLET ORAL
Qty: 30 TABLET | Refills: 0 | Status: SHIPPED | OUTPATIENT
Start: 2020-06-10 | End: 2020-07-02

## 2020-07-02 DIAGNOSIS — J45.20 MILD INTERMITTENT ASTHMA WITHOUT COMPLICATION: ICD-10-CM

## 2020-07-02 RX ORDER — MONTELUKAST SODIUM 10 MG/1
TABLET ORAL
Qty: 30 TABLET | Refills: 5 | Status: SHIPPED | OUTPATIENT
Start: 2020-07-02 | End: 2020-12-21

## 2020-07-08 ENCOUNTER — OFFICE VISIT (OUTPATIENT)
Dept: CARDIOLOGY | Facility: CLINIC | Age: 20
End: 2020-07-08

## 2020-07-08 VITALS
WEIGHT: 134 LBS | BODY MASS INDEX: 21.03 KG/M2 | SYSTOLIC BLOOD PRESSURE: 138 MMHG | HEART RATE: 77 BPM | HEIGHT: 67 IN | OXYGEN SATURATION: 97 % | DIASTOLIC BLOOD PRESSURE: 84 MMHG

## 2020-07-08 DIAGNOSIS — R00.0 TACHYCARDIA: ICD-10-CM

## 2020-07-08 DIAGNOSIS — I61.9 INTRAPARENCHYMAL HEMORRHAGE OF BRAIN (HCC): ICD-10-CM

## 2020-07-08 DIAGNOSIS — E04.1 THYROID NODULE: Primary | ICD-10-CM

## 2020-07-08 DIAGNOSIS — E78.2 MIXED HYPERLIPIDEMIA: ICD-10-CM

## 2020-07-08 DIAGNOSIS — Z79.01 LONG TERM (CURRENT) USE OF ANTICOAGULANTS: ICD-10-CM

## 2020-07-08 DIAGNOSIS — E03.9 HYPOTHYROIDISM, UNSPECIFIED TYPE: ICD-10-CM

## 2020-07-08 DIAGNOSIS — G08 THROMBOSIS OF SUPERIOR SAGITTAL SINUS: ICD-10-CM

## 2020-07-08 PROCEDURE — 99214 OFFICE O/P EST MOD 30 MIN: CPT | Performed by: INTERNAL MEDICINE

## 2020-07-08 PROCEDURE — 93000 ELECTROCARDIOGRAM COMPLETE: CPT | Performed by: INTERNAL MEDICINE

## 2020-07-08 NOTE — PROGRESS NOTES
Aydin Thorpe  [id number]  2000  19 y.o.  female    Referring Provider: Vamsi Tucker MD    Reason for  Visit:      Here for routine follow up   Initial visit for palpitations   Recent sagittal vein thrombosis and was admitted to [place]  Intracranial hemorrhage   Treated conservatively  on anticoagulation  Unprovoked thrombosis   cerebrovascular accident recovered and still doing physical therapy    Subjective      Overall feels well    No new events or complaints since last visit   Overall the patient feels no major change from baseline symptoms   Similar symptoms as during last visit     Tolerating current medications well with no untoward side effects   Compliant with prescribed medication regimen. Tries to adhere to cardiac diet.      Mild chronic exertional shortness of breath on exertion relieved with rest  No significant cough or wheezing    No further  palpitations  No associated chest pain  No significant pedal edema    No fever or chills  No significant expectoration    No hemoptysis  No presyncope or syncope     She wants to be seen by Neurosurgery and Neurologist in [place]     History of present illness:  Aydin Thorpe is a 19 y.o. yo female with history of sagittal vein thrombosis  who presents today for   Chief Complaint   Patient presents with   • Rapid Heart Rate     4 month follow up    .    History  Past Medical History:   Diagnosis Date   • Asthma    • Chronic eczematous otitis externa of both ears    • Hyperlipidemia    • Stroke (CMS/HCC)     1/2020   ,   Past Surgical History:   Procedure Laterality Date   • NO PAST SURGERIES     ,   Family History   Problem Relation Age of Onset   • Osteoarthritis Mother    • Asthma Mother    • Rheum arthritis Father    • Asthma Father    • Heart disease Father    • No Known Problems Maternal Grandfather    • No Known Problems Paternal Grandmother    • No Known Problems Paternal Grandfather    ,   Social History     Tobacco Use   •  Smoking status: Never Smoker   • Smokeless tobacco: Never Used   Substance Use Topics   • Alcohol use: No   • Drug use: No   ,     Medications  Current Outpatient Medications   Medication Sig Dispense Refill   • ALBUTEROL SULFATE  (90 Base) MCG/ACT inhaler INHALE TWO PUFFS BY MOUTH FOUR TIMES A DAY AS NEEDED 18 g 5   • atorvastatin (LIPITOR) 40 MG tablet Take 40 mg by mouth Daily.     • betamethasone dipropionate (DIPROLENE) 0.05 % cream Apply  topically to the appropriate area as directed 2 (Two) Times a Day. 15 g 2   • dexamethasone (DECADRON) 0.75 MG tablet TAKE ONE TABLET BY MOUTH EVERY DAY WITH BREAKFAST 30 tablet 5   • hydrOXYzine (ATARAX) 25 MG tablet TAKE ONE TABLET BY MOUTH TWICE A DAY 60 tablet 5   • hydrOXYzine pamoate (VISTARIL) 25 MG capsule Take 1 capsule by mouth 3 (Three) Times a Day As Needed for Itching. 60 capsule 5   • levothyroxine (SYNTHROID, LEVOTHROID) 50 MCG tablet TAKE ONE TABLET BY MOUTH EVERY DAY 90 tablet 1   • montelukast (SINGULAIR) 10 MG tablet TAKE ONE TABLET BY MOUTH EVERY EVENING 30 tablet 5   • warfarin (COUMADIN) 2.5 MG tablet Take 1/2 to 1 tablet by mouth daily or dose per direction of Anticoagulation Clinic.       No current facility-administered medications for this visit.        Allergies:  Patient has no known allergies.    Review of Systems  Review of Systems   Constitution: Negative. Negative for diaphoresis and fever.   HENT: Negative.    Eyes: Negative.    Cardiovascular: Negative for chest pain, claudication, cyanosis, dyspnea on exertion, irregular heartbeat, leg swelling, near-syncope, orthopnea, palpitations, paroxysmal nocturnal dyspnea and syncope.   Respiratory: Negative.  Negative for shortness of breath.    Endocrine: Negative.    Hematologic/Lymphatic: Negative.    Skin: Negative.    Musculoskeletal: Negative for back pain and neck pain.   Gastrointestinal: Negative for abdominal pain, anorexia, bowel incontinence, nausea and vomiting.   Genitourinary:  "Negative.  Negative for bladder incontinence.   Neurological: Negative.  Negative for dizziness, focal weakness, headaches, light-headedness, loss of balance, vertigo and weakness.   Psychiatric/Behavioral: Negative.  Negative for altered mental status and memory loss.       Objective     Physical Exam:  /84   Pulse 77   Ht 170.2 cm (67\")   Wt 60.8 kg (134 lb)   SpO2 97%   BMI 20.99 kg/m²     Physical Exam   Constitutional: She appears well-developed.   HENT:   Head: Normocephalic.   Neck: Normal carotid pulses and no JVD present. No tracheal tenderness present. Carotid bruit is not present. No tracheal deviation and no edema present.   Cardiovascular: Regular rhythm, normal heart sounds and normal pulses.   Pulmonary/Chest: Effort normal. No stridor.   Abdominal: Soft. She exhibits no distension. There is no hepatosplenomegaly. There is no tenderness.   Neurological: She is alert. She has normal strength. No cranial nerve deficit or sensory deficit.   Skin: Skin is warm.   Psychiatric: She has a normal mood and affect. Her speech is normal and behavior is normal.       Results Review:      Results for orders placed during the hospital encounter of 03/06/20   Adult Transthoracic Echo Complete W/ Cont if Necessary Per Protocol    Narrative · Estimated EF = 60%.  · Left ventricular systolic function is normal.  · Left ventricular diastolic function is normal.  · No evidence of pulmonary hypertension is present.                ECG 12 Lead  Date/Time: 7/8/2020 2:51 PM  Performed by: Wan Turpin MD  Authorized by: Wan Turpin MD   Comparison: compared with previous ECG from 2/26/2020  Similar to previous ECG  Rhythm: sinus rhythm  Rate: normal  Conduction: conduction normal  Conduction comments: RSR pattern   ST Segments: ST segments normal  QRS axis: normal  Other: no other findings    Clinical impression: normal ECG            Assessment/Plan   Aydin was seen today for rapid heart rate.    Diagnoses " and all orders for this visit:    Thyroid nodule    Thrombosis of superior sagittal sinus    Tachycardia    Long term (current) use of anticoagulants    Intraparenchymal hemorrhage of brain (CMS/HCC)    Hypothyroidism, unspecified type    Mixed hyperlipidemia    Other orders  -     ECG 12 Lead          ____________________________________________________________________________________________________________________________________________  Health maintenance and recommendations      Low vitamin K diet.  knowing what to eat, tips what foods to avoid. Printed dietary instructions with food items and Vitamin K content with websites provided.  Target INR 2-3      Similar recommendations as last visit     Offered to give patient  a copy of my notes       Questions were encouraged, asked and answered to the patient's  understanding and satisfaction. Questions if any regarding current medications and side effects, need for refills and importance of compliance to medications stressed.    Reviewed available prior notes, consults, prior visits, laboratory findings, radiology and cardiology relevant reports. Updated chart as applicable. I have reviewed the patient's medical history in detail and updated the computerized patient record as relevant.      Updated patient regarding any new or relevant abnormalities on review of records or any new findings on physical exam. Mentioned to patient about purpose of visit and desirable health short and long term goals and objectives.    Primary to monitor CBC CMP Lipid panel and TSH as applicable    ___________________________________________________________________________________________________________________________________________      Plan      Get 30 day monitor report from [facility]      The current medical regimen is effective;  continue present plan and medications.     Discussed results of prior testing with patient  Patient expressed understanding  Encouraged and  answered all questions   Discussed with the patient and all questioned fully answered. She will call me if any problems arise.      Dr Mondragon planning hypercoagulable workup 2 weeks after stopping Warfarin   She will be going [facility] next week with repeat CT of her head followed by neurology evaluation with plans to stop anticoagulation          Return in about 6 months (around 1/8/2021).

## 2020-07-09 ENCOUNTER — TELEPHONE (OUTPATIENT)
Dept: CARDIOLOGY | Facility: CLINIC | Age: 20
End: 2020-07-09

## 2020-07-09 NOTE — TELEPHONE ENCOUNTER
RECORDS REQUESTED FROM [facility] VIA FAX (CARDIAC & 30 DAY HOLTER REPORT WITH STRIPS) PER DR BALLESTEROS.

## 2020-09-28 RX ORDER — ALBUTEROL SULFATE 90 UG/1
AEROSOL, METERED RESPIRATORY (INHALATION)
Qty: 18 G | Refills: 5 | Status: SHIPPED | OUTPATIENT
Start: 2020-09-28 | End: 2021-03-25

## 2020-12-21 DIAGNOSIS — E03.9 HYPOTHYROIDISM, UNSPECIFIED TYPE: ICD-10-CM

## 2020-12-21 DIAGNOSIS — J45.20 MILD INTERMITTENT ASTHMA WITHOUT COMPLICATION: ICD-10-CM

## 2020-12-21 RX ORDER — MONTELUKAST SODIUM 10 MG/1
TABLET ORAL
Qty: 30 TABLET | Refills: 5 | Status: SHIPPED | OUTPATIENT
Start: 2020-12-21 | End: 2021-06-21

## 2020-12-21 RX ORDER — LEVOTHYROXINE SODIUM 0.05 MG/1
TABLET ORAL
Qty: 90 TABLET | Refills: 1 | Status: SHIPPED | OUTPATIENT
Start: 2020-12-21 | End: 2021-06-21

## 2021-01-04 ENCOUNTER — TELEPHONE (OUTPATIENT)
Dept: FAMILY MEDICINE CLINIC | Facility: CLINIC | Age: 21
End: 2021-01-04

## 2021-01-04 DIAGNOSIS — E78.5 HYPERLIPIDEMIA, UNSPECIFIED HYPERLIPIDEMIA TYPE: Primary | ICD-10-CM

## 2021-01-04 RX ORDER — ATORVASTATIN CALCIUM 40 MG/1
40 TABLET, FILM COATED ORAL DAILY
Qty: 30 TABLET | Refills: 11 | Status: SHIPPED | OUTPATIENT
Start: 2021-01-04 | End: 2021-08-25 | Stop reason: SDUPTHER

## 2021-01-04 NOTE — TELEPHONE ENCOUNTER
PATIENTS MOTHER CALLED ASKING IF THE PATIENT COULD GET A PRESCRIPTION FOR PRAVASTATIN 40MG, THE PATIENTS PREVIOUS DOCTOR JENNIFER LARA (NEUROLOGIST) IN [facility] WAS PRESCRIBING THE MEDICATION AND SHE IS WANTING TO SEE IF DR. VALENCIA COULD START PRESCRIBING THIS MEDICATION.      GOOD CALL BACK   [phone]     [facility] [place], [place] - [address] [phone] [facility] [phone] FX      PATIENT CURRENTLY HAS ENOUGH MEDICATION UNTIL Wednesday 01/06/2021.

## 2021-01-04 NOTE — TELEPHONE ENCOUNTER
PATIENTS MOTHER CALLED ASKING IF THE PATIENT COULD GET A PRESCRIPTION FOR PRAVASTATIN 40MG, THE PATIENTS PREVIOUS DOCTOR JENNIFER LARA (NEUROLOGIST) IN [facility] WAS PRESCRIBING THE MEDICATION AND SHE IS WANTING TO SEE IF DR. VALENCIA COULD START PRESCRIBING THIS MEDICATION.     GOOD CALL BACK   [phone]    [facility] DR[facility] [place], [place] - [address] [phone] [facility] [phone] FX     PATIENT CURRENTLY HAS ENOUGH MEDICATION UNTIL Wednesday

## 2021-01-04 NOTE — TELEPHONE ENCOUNTER
Please confirm with patient or mother that it is PRAVASTATIN 40 mg.  We have ATORVASTATIN 40 mg listed on her current med list.  I will gladly refill for patient once we have a confirmation.

## 2021-03-25 RX ORDER — ALBUTEROL SULFATE 90 UG/1
AEROSOL, METERED RESPIRATORY (INHALATION)
Qty: 18 G | Refills: 5 | Status: SHIPPED | OUTPATIENT
Start: 2021-03-25 | End: 2021-08-25 | Stop reason: SDUPTHER

## 2021-06-19 DIAGNOSIS — J45.20 MILD INTERMITTENT ASTHMA WITHOUT COMPLICATION: ICD-10-CM

## 2021-06-19 DIAGNOSIS — E03.9 HYPOTHYROIDISM, UNSPECIFIED TYPE: ICD-10-CM

## 2021-06-21 RX ORDER — LEVOTHYROXINE SODIUM 0.05 MG/1
TABLET ORAL
Qty: 90 TABLET | Refills: 0 | Status: SHIPPED | OUTPATIENT
Start: 2021-06-21 | End: 2021-08-25 | Stop reason: SDUPTHER

## 2021-06-21 RX ORDER — MONTELUKAST SODIUM 10 MG/1
TABLET ORAL
Qty: 90 TABLET | Refills: 0 | Status: SHIPPED | OUTPATIENT
Start: 2021-06-21 | End: 2021-08-25 | Stop reason: SDUPTHER

## 2021-06-21 NOTE — TELEPHONE ENCOUNTER
Last ov on 1/14/21. Patient over due for annual and labs. Please contact patient's mother to schedule appts . 30 days pended

## 2021-07-23 ENCOUNTER — TELEPHONE (OUTPATIENT)
Dept: FAMILY MEDICINE CLINIC | Facility: CLINIC | Age: 21
End: 2021-07-23

## 2021-07-23 DIAGNOSIS — Z00.00 ANNUAL PHYSICAL EXAM: ICD-10-CM

## 2021-07-23 DIAGNOSIS — M25.50 ARTHRALGIA, UNSPECIFIED JOINT: ICD-10-CM

## 2021-07-23 DIAGNOSIS — R53.83 FATIGUE, UNSPECIFIED TYPE: ICD-10-CM

## 2021-07-23 DIAGNOSIS — E78.5 HYPERLIPIDEMIA, UNSPECIFIED HYPERLIPIDEMIA TYPE: Primary | ICD-10-CM

## 2021-07-23 NOTE — TELEPHONE ENCOUNTER
Pt's mother called, Pt has appt for annual labs 7/27/21 and would like to be checked for RA at that time if possible. Can order be added?

## 2021-07-27 ENCOUNTER — CLINICAL SUPPORT (OUTPATIENT)
Dept: FAMILY MEDICINE CLINIC | Facility: CLINIC | Age: 21
End: 2021-07-27

## 2021-07-27 DIAGNOSIS — R53.83 FATIGUE, UNSPECIFIED TYPE: ICD-10-CM

## 2021-07-27 DIAGNOSIS — Z00.00 ANNUAL PHYSICAL EXAM: ICD-10-CM

## 2021-07-27 DIAGNOSIS — E78.5 HYPERLIPIDEMIA, UNSPECIFIED HYPERLIPIDEMIA TYPE: Primary | ICD-10-CM

## 2021-07-27 DIAGNOSIS — E03.9 HYPOTHYROIDISM, UNSPECIFIED TYPE: ICD-10-CM

## 2021-07-28 LAB
ALBUMIN SERPL-MCNC: 5.2 G/DL (ref 3.9–5)
ALBUMIN/GLOB SERPL: 2.2 {RATIO} (ref 1.2–2.2)
ALP SERPL-CCNC: 90 IU/L (ref 48–121)
ALT SERPL-CCNC: 12 IU/L (ref 0–32)
AST SERPL-CCNC: 14 IU/L (ref 0–40)
BASOPHILS # BLD AUTO: 0.1 X10E3/UL (ref 0–0.2)
BASOPHILS NFR BLD AUTO: 1 %
BILIRUB SERPL-MCNC: 0.7 MG/DL (ref 0–1.2)
BUN SERPL-MCNC: 7 MG/DL (ref 6–20)
BUN/CREAT SERPL: 9 (ref 9–23)
CALCIUM SERPL-MCNC: 10.3 MG/DL (ref 8.7–10.2)
CHLORIDE SERPL-SCNC: 103 MMOL/L (ref 96–106)
CHOLEST SERPL-MCNC: 141 MG/DL (ref 100–199)
CO2 SERPL-SCNC: 24 MMOL/L (ref 20–29)
CREAT SERPL-MCNC: 0.74 MG/DL (ref 0.57–1)
CRP SERPL-MCNC: 1 MG/L (ref 0–10)
EOSINOPHIL # BLD AUTO: 0.4 X10E3/UL (ref 0–0.4)
EOSINOPHIL NFR BLD AUTO: 3 %
ERYTHROCYTE [DISTWIDTH] IN BLOOD BY AUTOMATED COUNT: 11.8 % (ref 11.7–15.4)
ERYTHROCYTE [SEDIMENTATION RATE] IN BLOOD BY WESTERGREN METHOD: 2 MM/HR (ref 0–32)
GLOBULIN SER CALC-MCNC: 2.4 G/DL (ref 1.5–4.5)
GLUCOSE SERPL-MCNC: 87 MG/DL (ref 65–99)
HCT VFR BLD AUTO: 45.9 % (ref 34–46.6)
HCV AB S/CO SERPL IA: 0.1 S/CO RATIO (ref 0–0.9)
HDLC SERPL-MCNC: 67 MG/DL
HGB BLD-MCNC: 15.1 G/DL (ref 11.1–15.9)
IMM GRANULOCYTES # BLD AUTO: 0 X10E3/UL (ref 0–0.1)
IMM GRANULOCYTES NFR BLD AUTO: 0 %
LDLC SERPL CALC-MCNC: 63 MG/DL (ref 0–99)
LDLC/HDLC SERPL: 0.9 RATIO (ref 0–3.2)
LYMPHOCYTES # BLD AUTO: 2.8 X10E3/UL (ref 0.7–3.1)
LYMPHOCYTES NFR BLD AUTO: 21 %
MCH RBC QN AUTO: 30.9 PG (ref 26.6–33)
MCHC RBC AUTO-ENTMCNC: 32.9 G/DL (ref 31.5–35.7)
MCV RBC AUTO: 94 FL (ref 79–97)
MONOCYTES # BLD AUTO: 0.6 X10E3/UL (ref 0.1–0.9)
MONOCYTES NFR BLD AUTO: 4 %
NEUTROPHILS # BLD AUTO: 9.2 X10E3/UL (ref 1.4–7)
NEUTROPHILS NFR BLD AUTO: 71 %
PLATELET # BLD AUTO: 367 X10E3/UL (ref 150–450)
POTASSIUM SERPL-SCNC: 3.6 MMOL/L (ref 3.5–5.2)
PROT SERPL-MCNC: 7.6 G/DL (ref 6–8.5)
RBC # BLD AUTO: 4.88 X10E6/UL (ref 3.77–5.28)
RHEUMATOID FACT SERPL-ACNC: <10 IU/ML (ref 0–13.9)
SODIUM SERPL-SCNC: 143 MMOL/L (ref 134–144)
T4 SERPL-MCNC: 8.2 UG/DL (ref 4.5–12)
TRIGL SERPL-MCNC: 52 MG/DL (ref 0–149)
TSH SERPL DL<=0.005 MIU/L-ACNC: 1.09 UIU/ML (ref 0.45–4.5)
VLDLC SERPL CALC-MCNC: 11 MG/DL (ref 5–40)
WBC # BLD AUTO: 13 X10E3/UL (ref 3.4–10.8)

## 2021-08-25 ENCOUNTER — OFFICE VISIT (OUTPATIENT)
Dept: FAMILY MEDICINE CLINIC | Facility: CLINIC | Age: 21
End: 2021-08-25

## 2021-08-25 VITALS
SYSTOLIC BLOOD PRESSURE: 117 MMHG | WEIGHT: 115.4 LBS | OXYGEN SATURATION: 95 % | HEIGHT: 66 IN | TEMPERATURE: 98.5 F | BODY MASS INDEX: 18.54 KG/M2 | HEART RATE: 125 BPM | DIASTOLIC BLOOD PRESSURE: 85 MMHG

## 2021-08-25 DIAGNOSIS — Z00.00 ANNUAL PHYSICAL EXAM: Primary | ICD-10-CM

## 2021-08-25 DIAGNOSIS — E78.5 HYPERLIPIDEMIA, UNSPECIFIED HYPERLIPIDEMIA TYPE: ICD-10-CM

## 2021-08-25 DIAGNOSIS — R63.6 UNDERWEIGHT: ICD-10-CM

## 2021-08-25 DIAGNOSIS — J45.20 MILD INTERMITTENT ASTHMA WITHOUT COMPLICATION: ICD-10-CM

## 2021-08-25 DIAGNOSIS — E03.9 HYPOTHYROIDISM, UNSPECIFIED TYPE: ICD-10-CM

## 2021-08-25 DIAGNOSIS — L30.9 ECZEMA, UNSPECIFIED TYPE: ICD-10-CM

## 2021-08-25 PROCEDURE — 99395 PREV VISIT EST AGE 18-39: CPT | Performed by: NURSE PRACTITIONER

## 2021-08-25 RX ORDER — ATORVASTATIN CALCIUM 40 MG/1
40 TABLET, FILM COATED ORAL DAILY
Qty: 30 TABLET | Refills: 11 | Status: SHIPPED | OUTPATIENT
Start: 2021-08-25 | End: 2022-08-24

## 2021-08-25 RX ORDER — ALBUTEROL SULFATE 90 UG/1
2 AEROSOL, METERED RESPIRATORY (INHALATION) EVERY 6 HOURS PRN
Qty: 18 G | Refills: 5 | Status: SHIPPED | OUTPATIENT
Start: 2021-08-25 | End: 2022-03-11

## 2021-08-25 RX ORDER — LEVOTHYROXINE SODIUM 0.05 MG/1
50 TABLET ORAL DAILY
Qty: 90 TABLET | Refills: 3 | Status: SHIPPED | OUTPATIENT
Start: 2021-08-25 | End: 2022-08-24

## 2021-08-25 RX ORDER — MONTELUKAST SODIUM 10 MG/1
10 TABLET ORAL EVERY EVENING
Qty: 90 TABLET | Refills: 3 | Status: SHIPPED | OUTPATIENT
Start: 2021-08-25 | End: 2022-09-23

## 2021-08-25 NOTE — PROGRESS NOTES
"Chief Complaint  Annual Exam    Subjective          Aydin Thorpe presents to [facility]  History of Present Illness  No current concerns.    Objective   Vital Signs:   /85 (BP Location: Left arm, Patient Position: Sitting, Cuff Size: Adult)   Pulse (!) 125   Temp 98.5 °F (36.9 °C)   Ht 167.6 cm (66\") Comment: pt reported  Wt 52.3 kg (115 lb 6.4 oz)   SpO2 95%   BMI 18.63 kg/m²       Physical Exam  Vitals and nursing note reviewed.   Constitutional:       General: She is not in acute distress.     Appearance: Normal appearance. She is well-developed. She is not diaphoretic.      Comments: Thin.   HENT:      Head: Normocephalic and atraumatic.   Eyes:      Conjunctiva/sclera: Conjunctivae normal.      Pupils: Pupils are equal, round, and reactive to light.   Cardiovascular:      Rate and Rhythm: Normal rate and regular rhythm.      Heart sounds: Normal heart sounds. No murmur heard.     Pulmonary:      Effort: Pulmonary effort is normal. No respiratory distress.      Breath sounds: Normal breath sounds.   Abdominal:      General: Bowel sounds are normal. There is no distension.      Palpations: Abdomen is soft.      Tenderness: There is no abdominal tenderness.   Genitourinary:     Comments: Deferred until another day.  Musculoskeletal:         General: Normal range of motion.      Cervical back: Normal range of motion and neck supple.   Skin:     General: Skin is warm and dry.      Capillary Refill: Capillary refill takes less than 2 seconds.      Findings: No erythema.   Neurological:      Mental Status: She is alert and oriented to person, place, and time. Mental status is at baseline.   Psychiatric:         Mood and Affect: Mood normal.         Behavior: Behavior normal.         Thought Content: Thought content normal.         Judgment: Judgment normal.        Result Review :                 Assessment and Plan    Diagnoses and all orders for this visit:    1. " Annual physical exam (Primary)    2. Hypothyroidism, unspecified type  -     levothyroxine (SYNTHROID, LEVOTHROID) 50 MCG tablet; Take 1 tablet by mouth Daily.  Dispense: 90 tablet; Refill: 3    3. Mild intermittent asthma without complication  -     albuterol sulfate  (90 Base) MCG/ACT inhaler; Inhale 2 puffs Every 6 (Six) Hours As Needed for Wheezing.  Dispense: 18 g; Refill: 5  -     montelukast (SINGULAIR) 10 MG tablet; Take 1 tablet by mouth Every Evening.  Dispense: 90 tablet; Refill: 3    4. Hyperlipidemia, unspecified hyperlipidemia type  -     atorvastatin (LIPITOR) 40 MG tablet; Take 1 tablet by mouth Daily.  Dispense: 30 tablet; Refill: 11    5. Eczema, unspecified type  -     dexamethasone (DECADRON) 0.75 MG tablet; Take 1 tablet by mouth Daily With Breakfast.  Dispense: 90 tablet; Refill: 3    6. Underweight    Patient encouraged to consume a high calorie diet for weight gain.  Patient encouraged to participate in daily weight bearing exercise with goal of 30 min sustained activity.      Follow Up   Return in about 1 year (around 8/25/2022) for Annual physical with labs prior (needs schedule for Pap this year, will call to schedule).  Patient was given instructions and counseling regarding her condition or for health maintenance advice. Please see specific information pulled into the AVS if appropriate.

## 2021-10-25 ENCOUNTER — OFFICE VISIT (OUTPATIENT)
Dept: FAMILY MEDICINE CLINIC | Facility: CLINIC | Age: 21
End: 2021-10-25

## 2021-10-25 VITALS
WEIGHT: 115.8 LBS | HEIGHT: 66 IN | DIASTOLIC BLOOD PRESSURE: 82 MMHG | TEMPERATURE: 98 F | OXYGEN SATURATION: 96 % | BODY MASS INDEX: 18.61 KG/M2 | SYSTOLIC BLOOD PRESSURE: 127 MMHG | HEART RATE: 92 BPM

## 2021-10-25 DIAGNOSIS — Z12.4 ENCOUNTER FOR PAPANICOLAOU SMEAR FOR CERVICAL CANCER SCREENING: Primary | ICD-10-CM

## 2021-10-25 DIAGNOSIS — L30.9 ECZEMA, UNSPECIFIED TYPE: ICD-10-CM

## 2021-10-25 PROCEDURE — 99213 OFFICE O/P EST LOW 20 MIN: CPT | Performed by: NURSE PRACTITIONER

## 2021-10-25 RX ORDER — TRIAMCINOLONE ACETONIDE 1 MG/G
1 CREAM TOPICAL 2 TIMES DAILY
Qty: 80 G | Refills: 2 | Status: SHIPPED | OUTPATIENT
Start: 2021-10-25

## 2021-10-25 NOTE — PROGRESS NOTES
"Answers for HPI/ROS submitted by the patient on 10/24/2021  What is the primary reason for your visit?: Physical    Chief Complaint  Gynecologic Exam (yearly pap )    Subjective          Aydin Thorpe presents to [facility]  History of Present Illness  Previously completed her annual wellness except for the Pap (was on her period on day of exam).  She also has a \"patch\" of eczema that has flared up and is out of steroid cream.  Objective   Vital Signs:   /82 (BP Location: Right arm, Patient Position: Sitting, Cuff Size: Adult)   Pulse 92   Temp 98 °F (36.7 °C)   Ht 167.6 cm (66\") Comment: pt reported  Wt 52.5 kg (115 lb 12.8 oz)   SpO2 96%   BMI 18.69 kg/m²     Physical Exam  Vitals and nursing note reviewed. Exam conducted with a chaperone present.   Constitutional:       General: She is not in acute distress.     Appearance: Normal appearance. She is not ill-appearing.   HENT:      Head: Normocephalic and atraumatic.   Cardiovascular:      Rate and Rhythm: Regular rhythm. Tachycardia present.      Heart sounds: Normal heart sounds.   Pulmonary:      Effort: Pulmonary effort is normal.      Breath sounds: Normal breath sounds.   Abdominal:      General: Bowel sounds are normal.      Palpations: Abdomen is soft.   Genitourinary:     General: Normal vulva.      Rectum: Normal.      Comments: Pap obtained.  Musculoskeletal:         General: Normal range of motion.   Skin:     General: Skin is warm and dry.      Findings: Erythema and rash present.      Comments: Erythematous rash to left forearm consistent with known eczema.   Neurological:      Mental Status: She is alert and oriented to person, place, and time.   Psychiatric:         Thought Content: Thought content normal.        Result Review :                 Assessment and Plan    Diagnoses and all orders for this visit:    1. Encounter for Papanicolaou smear for cervical cancer screening (Primary)  -     " IGP,Aptima HPV,Age Gdln    2. Eczema, unspecified type  -     triamcinolone (KENALOG) 0.1 % cream; Apply 1 application topically to the appropriate area as directed 2 (Two) Times a Day.  Dispense: 80 g; Refill: 2        Follow Up   No follow-ups on file.  Patient was given instructions and counseling regarding her condition or for health maintenance advice. Please see specific information pulled into the AVS if appropriate.

## 2021-10-27 LAB
AGE GDLN ACOG TESTING: NORMAL
CONV .: NORMAL
CYTOLOGIST CVX/VAG CYTO: NORMAL
CYTOLOGY CVX/VAG DOC CYTO: NORMAL
CYTOLOGY CVX/VAG DOC THIN PREP: NORMAL
DX ICD CODE: NORMAL
HIV 1 & 2 AB SER-IMP: NORMAL
OTHER STN SPEC: NORMAL
STAT OF ADQ CVX/VAG CYTO-IMP: NORMAL

## 2022-03-11 DIAGNOSIS — J45.20 MILD INTERMITTENT ASTHMA WITHOUT COMPLICATION: ICD-10-CM

## 2022-03-11 NOTE — TELEPHONE ENCOUNTER
Rx Refill Note  Requested Prescriptions     Pending Prescriptions Disp Refills   • ProAir  (90 Base) MCG/ACT inhaler [Pharmacy Med Name: PROAIR HFA 108MCG/ACT AERS] 8.5 g 5     Sig: INHALE TWO PUFFS BY MOUTH EVERY 6 HOURS AS NEEDED FOR WHEEZING      Last office visit with prescribing clinician: 10/25/2021      Next office visit with prescribing clinician: 8/25/2022     Terri Storey MA  03/11/22, 14:23 CST

## 2022-08-23 DIAGNOSIS — E03.9 HYPOTHYROIDISM, UNSPECIFIED TYPE: ICD-10-CM

## 2022-08-23 DIAGNOSIS — J45.20 MILD INTERMITTENT ASTHMA WITHOUT COMPLICATION: ICD-10-CM

## 2022-08-23 DIAGNOSIS — E78.5 HYPERLIPIDEMIA, UNSPECIFIED HYPERLIPIDEMIA TYPE: ICD-10-CM

## 2022-08-24 RX ORDER — ATORVASTATIN CALCIUM 40 MG/1
TABLET, FILM COATED ORAL
Qty: 30 TABLET | Refills: 2 | Status: SHIPPED | OUTPATIENT
Start: 2022-08-24 | End: 2022-11-08 | Stop reason: SDUPTHER

## 2022-08-24 RX ORDER — LEVOTHYROXINE SODIUM 0.05 MG/1
TABLET ORAL
Qty: 90 TABLET | Refills: 0 | Status: SHIPPED | OUTPATIENT
Start: 2022-08-24 | End: 2022-11-08 | Stop reason: SDUPTHER

## 2022-08-24 NOTE — TELEPHONE ENCOUNTER
Rx Refill Note  Requested Prescriptions     Pending Prescriptions Disp Refills   • dexamethasone (DECADRON) 0.75 MG tablet [Pharmacy Med Name: DEXAMETHASONE 0.75MG TABS] 30 tablet 4     Sig: TAKE ONE TABLET BY MOUTH EVERY DAY WITH BREAKFAST   • levothyroxine (SYNTHROID, LEVOTHROID) 50 MCG tablet [Pharmacy Med Name: LEVOTHYROXINE SODIUM 50MCG TABS] 90 tablet 0     Sig: TAKE ONE TABLET BY MOUTH EVERY DAY   • atorvastatin (LIPITOR) 40 MG tablet [Pharmacy Med Name: ATORVASTATIN CALCIUM 40MG TABS] 30 tablet 2     Sig: TAKE ONE TABLET BY MOUTH EVERY DAY   • ProAir  (90 Base) MCG/ACT inhaler [Pharmacy Med Name: PROAIR HFA 108MCG/ACT AERS] 8.5 g 4     Sig: INHALE TWO PUFFS BY MOUTH EVERY 6 HOURS AS NEEDED FOR WHEEZING      Last office visit with prescribing clinician: 10/25/2021      Next office visit with prescribing clinician: 8/29/2022     Terri Storey MA  08/24/22, 08:55 CDT

## 2022-09-23 DIAGNOSIS — J45.20 MILD INTERMITTENT ASTHMA WITHOUT COMPLICATION: ICD-10-CM

## 2022-09-23 RX ORDER — MONTELUKAST SODIUM 10 MG/1
TABLET ORAL
Qty: 90 TABLET | Refills: 2 | Status: SHIPPED | OUTPATIENT
Start: 2022-09-23

## 2022-09-23 NOTE — TELEPHONE ENCOUNTER
Rx Refill Note  Requested Prescriptions     Pending Prescriptions Disp Refills   • montelukast (SINGULAIR) 10 MG tablet [Pharmacy Med Name: MONTELUKAST SODIUM 10MG TABS] 90 tablet 2     Sig: TAKE ONE TABLET BY MOUTH EVERY EVENING      Last office visit with prescribing clinician: 10/25/2021      Next office visit with prescribing clinician: Visit date not found  LABS: 07/27/2021      Lala Escamilla MA  09/23/22, 11:49 CDT

## 2022-10-13 DIAGNOSIS — Z00.00 ENCOUNTER FOR ANNUAL PHYSICAL EXAM: Primary | ICD-10-CM

## 2022-10-13 DIAGNOSIS — E78.00 ELEVATED LDL CHOLESTEROL LEVEL: ICD-10-CM

## 2022-10-13 DIAGNOSIS — M25.50 MULTIPLE JOINT PAIN: ICD-10-CM

## 2022-10-13 DIAGNOSIS — E03.9 ACQUIRED HYPOTHYROIDISM: ICD-10-CM

## 2022-10-13 DIAGNOSIS — J45.20 MILD INTERMITTENT ASTHMA WITHOUT COMPLICATION: ICD-10-CM

## 2022-10-13 DIAGNOSIS — Z82.61 FAMILY HISTORY OF RHEUMATOID ARTHRITIS: ICD-10-CM

## 2022-10-13 DIAGNOSIS — E55.9 VITAMIN D DEFICIENCY: ICD-10-CM

## 2022-10-21 ENCOUNTER — CLINICAL SUPPORT (OUTPATIENT)
Dept: FAMILY MEDICINE CLINIC | Facility: CLINIC | Age: 22
End: 2022-10-21

## 2022-10-21 DIAGNOSIS — E03.9 ACQUIRED HYPOTHYROIDISM: ICD-10-CM

## 2022-10-21 DIAGNOSIS — M25.50 MULTIPLE JOINT PAIN: ICD-10-CM

## 2022-10-21 DIAGNOSIS — E55.9 VITAMIN D DEFICIENCY: ICD-10-CM

## 2022-10-21 DIAGNOSIS — E78.5 HYPERLIPIDEMIA, UNSPECIFIED HYPERLIPIDEMIA TYPE: ICD-10-CM

## 2022-10-21 DIAGNOSIS — Z82.61 FAMILY HISTORY OF RHEUMATOID ARTHRITIS: ICD-10-CM

## 2022-10-21 DIAGNOSIS — E78.00 ELEVATED LDL CHOLESTEROL LEVEL: ICD-10-CM

## 2022-10-21 DIAGNOSIS — E03.9 HYPOTHYROIDISM, UNSPECIFIED TYPE: ICD-10-CM

## 2022-10-21 DIAGNOSIS — R53.83 FATIGUE, UNSPECIFIED TYPE: ICD-10-CM

## 2022-10-21 DIAGNOSIS — Z00.00 ENCOUNTER FOR ANNUAL PHYSICAL EXAM: Primary | ICD-10-CM

## 2022-10-24 LAB
25(OH)D3+25(OH)D2 SERPL-MCNC: 38.7 NG/ML (ref 30–100)
ALBUMIN SERPL-MCNC: 4.6 G/DL (ref 3.9–5)
ALBUMIN/GLOB SERPL: 2 {RATIO} (ref 1.2–2.2)
ALP SERPL-CCNC: 56 IU/L (ref 44–121)
ALT SERPL-CCNC: 14 IU/L (ref 0–32)
ANA SER QL: NEGATIVE
AST SERPL-CCNC: 15 IU/L (ref 0–40)
BASOPHILS # BLD AUTO: 0.1 X10E3/UL (ref 0–0.2)
BASOPHILS NFR BLD AUTO: 1 %
BILIRUB SERPL-MCNC: 0.6 MG/DL (ref 0–1.2)
BUN SERPL-MCNC: 7 MG/DL (ref 6–20)
BUN/CREAT SERPL: 10 (ref 9–23)
CALCIUM SERPL-MCNC: 9.9 MG/DL (ref 8.7–10.2)
CHLORIDE SERPL-SCNC: 103 MMOL/L (ref 96–106)
CHOLEST SERPL-MCNC: 127 MG/DL (ref 100–199)
CO2 SERPL-SCNC: 26 MMOL/L (ref 20–29)
CREAT SERPL-MCNC: 0.67 MG/DL (ref 0.57–1)
CRP SERPL-MCNC: <1 MG/L (ref 0–10)
EGFRCR SERPLBLD CKD-EPI 2021: 125 ML/MIN/1.73
EOSINOPHIL # BLD AUTO: 0.3 X10E3/UL (ref 0–0.4)
EOSINOPHIL NFR BLD AUTO: 4 %
ERYTHROCYTE [DISTWIDTH] IN BLOOD BY AUTOMATED COUNT: 11.8 % (ref 11.7–15.4)
GLOBULIN SER CALC-MCNC: 2.3 G/DL (ref 1.5–4.5)
GLUCOSE SERPL-MCNC: 86 MG/DL (ref 70–99)
HCT VFR BLD AUTO: 43.7 % (ref 34–46.6)
HDLC SERPL-MCNC: 61 MG/DL
HGB BLD-MCNC: 15.7 G/DL (ref 11.1–15.9)
IMM GRANULOCYTES # BLD AUTO: 0 X10E3/UL (ref 0–0.1)
IMM GRANULOCYTES NFR BLD AUTO: 0 %
LDLC SERPL CALC-MCNC: 55 MG/DL (ref 0–99)
LDLC/HDLC SERPL: 0.9 RATIO (ref 0–3.2)
LYMPHOCYTES # BLD AUTO: 3.1 X10E3/UL (ref 0.7–3.1)
LYMPHOCYTES NFR BLD AUTO: 42 %
MCH RBC QN AUTO: 33.5 PG (ref 26.6–33)
MCHC RBC AUTO-ENTMCNC: 35.9 G/DL (ref 31.5–35.7)
MCV RBC AUTO: 93 FL (ref 79–97)
MONOCYTES # BLD AUTO: 0.5 X10E3/UL (ref 0.1–0.9)
MONOCYTES NFR BLD AUTO: 7 %
NEUTROPHILS # BLD AUTO: 3.5 X10E3/UL (ref 1.4–7)
NEUTROPHILS NFR BLD AUTO: 46 %
PLATELET # BLD AUTO: 303 X10E3/UL (ref 150–450)
POTASSIUM SERPL-SCNC: 3.6 MMOL/L (ref 3.5–5.2)
PROT SERPL-MCNC: 6.9 G/DL (ref 6–8.5)
RBC # BLD AUTO: 4.68 X10E6/UL (ref 3.77–5.28)
RHEUMATOID FACT SERPL-ACNC: <10 IU/ML
SODIUM SERPL-SCNC: 143 MMOL/L (ref 134–144)
T4 SERPL-MCNC: 7.5 UG/DL (ref 4.5–12)
TRIGL SERPL-MCNC: 48 MG/DL (ref 0–149)
TSH SERPL DL<=0.005 MIU/L-ACNC: 1.3 UIU/ML (ref 0.45–4.5)
URATE SERPL-MCNC: 4.1 MG/DL (ref 2.6–6.2)
VLDLC SERPL CALC-MCNC: 11 MG/DL (ref 5–40)
WBC # BLD AUTO: 7.5 X10E3/UL (ref 3.4–10.8)

## 2022-11-08 ENCOUNTER — OFFICE VISIT (OUTPATIENT)
Dept: FAMILY MEDICINE CLINIC | Facility: CLINIC | Age: 22
End: 2022-11-08

## 2022-11-08 VITALS
SYSTOLIC BLOOD PRESSURE: 122 MMHG | HEART RATE: 114 BPM | OXYGEN SATURATION: 96 % | BODY MASS INDEX: 16.29 KG/M2 | DIASTOLIC BLOOD PRESSURE: 89 MMHG | WEIGHT: 103.8 LBS | HEIGHT: 67 IN

## 2022-11-08 DIAGNOSIS — R63.6 SEVERELY UNDERWEIGHT ADULT: ICD-10-CM

## 2022-11-08 DIAGNOSIS — Z00.00 ENCOUNTER FOR ANNUAL PHYSICAL EXAM: Primary | ICD-10-CM

## 2022-11-08 DIAGNOSIS — L30.9 ECZEMA, UNSPECIFIED TYPE: ICD-10-CM

## 2022-11-08 DIAGNOSIS — E78.5 HYPERLIPIDEMIA, UNSPECIFIED HYPERLIPIDEMIA TYPE: ICD-10-CM

## 2022-11-08 DIAGNOSIS — E03.9 HYPOTHYROIDISM, UNSPECIFIED TYPE: ICD-10-CM

## 2022-11-08 DIAGNOSIS — J45.20 MILD INTERMITTENT ASTHMA WITHOUT COMPLICATION: ICD-10-CM

## 2022-11-08 PROCEDURE — 3008F BODY MASS INDEX DOCD: CPT | Performed by: NURSE PRACTITIONER

## 2022-11-08 PROCEDURE — 2014F MENTAL STATUS ASSESS: CPT | Performed by: NURSE PRACTITIONER

## 2022-11-08 PROCEDURE — 99395 PREV VISIT EST AGE 18-39: CPT | Performed by: NURSE PRACTITIONER

## 2022-11-08 RX ORDER — ATORVASTATIN CALCIUM 40 MG/1
40 TABLET, FILM COATED ORAL DAILY
Qty: 90 TABLET | Refills: 3 | Status: SHIPPED | OUTPATIENT
Start: 2022-11-08

## 2022-11-08 RX ORDER — ALBUTEROL SULFATE 90 UG/1
2 AEROSOL, METERED RESPIRATORY (INHALATION) EVERY 4 HOURS PRN
Qty: 8.5 G | Refills: 4 | Status: SHIPPED | OUTPATIENT
Start: 2022-11-08

## 2022-11-08 RX ORDER — LEVOTHYROXINE SODIUM 0.05 MG/1
50 TABLET ORAL DAILY
Qty: 90 TABLET | Refills: 3 | Status: SHIPPED | OUTPATIENT
Start: 2022-11-08

## 2022-11-08 NOTE — PROGRESS NOTES
"Chief Complaint  Annual Exam    Subjective        Aydin Thorpe presents to [facility]  History of Present Illness  Patient offers no complaints today.    Objective   Vital Signs:  /89 (BP Location: Left arm, Patient Position: Sitting, Cuff Size: Adult)   Pulse 114   Ht 170.2 cm (67\")   Wt 47.1 kg (103 lb 12.8 oz)   SpO2 96%   BMI 16.26 kg/m²   Estimated body mass index is 16.26 kg/m² as calculated from the following:    Height as of this encounter: 170.2 cm (67\").    Weight as of this encounter: 47.1 kg (103 lb 12.8 oz).      Physical Exam  Vitals and nursing note reviewed. Exam conducted with a chaperone present.   Constitutional:       General: She is not in acute distress.     Appearance: Normal appearance. She is not ill-appearing.      Comments: Thin, underweight.   HENT:      Head: Normocephalic and atraumatic.      Right Ear: Tympanic membrane and ear canal normal.      Left Ear: Tympanic membrane and ear canal normal.      Nose: Nose normal.      Mouth/Throat:      Mouth: Mucous membranes are moist.      Pharynx: Oropharynx is clear.   Eyes:      Conjunctiva/sclera: Conjunctivae normal.   Cardiovascular:      Rate and Rhythm: Normal rate and regular rhythm.      Pulses: Normal pulses.      Heart sounds: Normal heart sounds. No murmur heard.  Pulmonary:      Effort: Pulmonary effort is normal.      Breath sounds: Normal breath sounds.   Abdominal:      General: Abdomen is flat. Bowel sounds are normal.      Palpations: Abdomen is soft.   Musculoskeletal:         General: Normal range of motion.      Cervical back: Normal range of motion and neck supple.   Lymphadenopathy:      Cervical: No cervical adenopathy.   Skin:     General: Skin is warm and dry.      Capillary Refill: Capillary refill takes less than 2 seconds.   Neurological:      Mental Status: She is alert and oriented to person, place, and time.   Psychiatric:         Thought Content: Thought " content normal.        Result Review :                Assessment and Plan   Diagnoses and all orders for this visit:    1. Encounter for annual physical exam (Primary)    2. Hypothyroidism, unspecified type  -     levothyroxine (SYNTHROID, LEVOTHROID) 50 MCG tablet; Take 1 tablet by mouth Daily.  Dispense: 90 tablet; Refill: 3    3. Hyperlipidemia, unspecified hyperlipidemia type  -     atorvastatin (LIPITOR) 40 MG tablet; Take 1 tablet by mouth Daily.  Dispense: 90 tablet; Refill: 3    4. Mild intermittent asthma without complication  -     albuterol sulfate HFA (ProAir HFA) 108 (90 Base) MCG/ACT inhaler; Inhale 2 puffs Every 4 (Four) Hours As Needed for Wheezing.  Dispense: 8.5 g; Refill: 4    5. Eczema, unspecified type  -     dexamethasone (DECADRON) 0.75 MG tablet; Take 1 tablet by mouth Daily With Breakfast.  Dispense: 90 tablet; Refill: 3    6. Severely underweight adult    Patient encouraged to partake of healthy, high calorie diet, rich in fresh fruits and vegetables as well as lean proteins.  Patient encouraged to participate in daily weight bearing exercise with goal of 30 min sustained activity.         Follow Up   Return in about 1 year (around 11/8/2023) for annual physical with labs prior.  Patient was given instructions and counseling regarding her condition or for health maintenance advice. Please see specific information pulled into the AVS if appropriate.     STEPHANIE Stout  This note is electronically signed.